# Patient Record
Sex: MALE | Race: WHITE | NOT HISPANIC OR LATINO | Employment: FULL TIME | URBAN - METROPOLITAN AREA
[De-identification: names, ages, dates, MRNs, and addresses within clinical notes are randomized per-mention and may not be internally consistent; named-entity substitution may affect disease eponyms.]

---

## 2017-11-15 ENCOUNTER — ALLSCRIPTS OFFICE VISIT (OUTPATIENT)
Dept: OTHER | Facility: OTHER | Age: 37
End: 2017-11-15

## 2017-11-15 DIAGNOSIS — F52.21 MALE ERECTILE DISORDER (CODE): ICD-10-CM

## 2018-01-11 NOTE — PROGRESS NOTES
Plan  Erectile dysfunction    · (1) CBC/PLT/DIFF; Status:Active; Requested for:53Nlu1146;    · (1) COMPREHENSIVE METABOLIC PANEL; Status:Active; Requested for:57Dyi2885;    · (1) LIPID PANEL, FASTING; Status:Active; Requested for:43Czf3370;    · (1) TESTOSTERONE; Status:Active; Requested for:39Usi8825;    · (1) TSH; Status:Active; Requested for:83Dzs2517;    · (1) URINALYSIS (will reflex a microscopy if leukocytes, occult blood, protein or nitrites  are not within normal limits); Status:Active; Requested for:05Ncg8324;   Flu vaccine need    · Stop: Fluzone Quadrivalent Intramuscular Suspension    Discussion/Summary  Impression: health maintenance visit  Currently, he eats a healthy diet  Prostate cancer screening: PSA is not indicated  Testicular cancer screening: clinical testicular exam was done today  Colorectal cancer screening: colorectal cancer screening is not indicated  Screening lab work includes glucose and lipid profile  The risks and benefits of immunizations were discussed  Doing well  Screening labs recommended  Follow up at the age of 37  History of Present Illness  HM, Adult Male: The patient is being seen for a health maintenance evaluation  The last health maintenance visit was 3 year(s) ago  Social History: Household members include spouse  He is   Work status: working full time and occupation:   The patient is a former cigarette smoker  He reports rare alcohol use  He has never used illicit drugs  General Health: The patient's health since the last visit is described as good  He has regular dental visits  He denies vision problems  He denies hearing loss  Immunizations status: up to date  Lifestyle:  He consumes a diverse and healthy diet  He does not have any weight concerns  He exercises regularly  He does not use tobacco  He denies alcohol use  He denies drug use  Reproductive health:  the patient is sexually active     Screening: cancer screening reviewed and updated  metabolic screening reviewed and updated  risk screening reviewed and updated  HPI: A young man with some erectile dysfunction which is a bit premature but easily treated with sialolith  He had been to Endocrinology several years ago  He was found to have testosterone levels that were the low end of normal but normal on the LEs  No testosterone supplementation was recommended  He feels perfectly well  Family      Review of Systems    Constitutional: no fever and no chills  Cardiovascular: no chest pain and no palpitations  Respiratory: no cough and no shortness of breath during exertion  Integumentary: no rashes and no itching  Neurological: no headache  Endocrine: erectile dysfunction, but as noted in HPI and no muscle weakness  Hematologic/Lymphatic: no swollen glands, no tendency for easy bleeding and no tendency for easy bruising  Active Problems    1  Erectile dysfunction (607 84) (F52 21)   2  Flu vaccine need (V04 81) (Z23)   3  Shoulder pain, right (719 41) (M25 511)   4  Testosterone insufficiency (259 9) (E34 9)    Surgical History    · Denied: History Of Prior Surgery    Family History  Mother    · Family history of type 2 diabetes mellitus (V18 0) (Z83 3)  Father    · Family history of type 2 diabetes mellitus (V18 0) (Z83 3)    Social History    · Alcohol drinker   · Former smoker (V15 82) (H98 314)   · Heterosexual (V15 89) (Z91 89)   · Denied: History of emotional abuse   · Denied: History of physical abuse   · Denied: History of sexual abuse   · History of tattoo (709 09) (L81 8)   · No drug use   · Occupation   · TV cameraman    Current Meds   1  Cialis 20 MG Oral Tablet; TAKE AS DIRECTED; Therapy: (Recorded:30Jan2014) to Recorded   2  Multi For Him Oral Tablet; Therapy: 06VDJ8987 to (Evaluate:00Zrs4646) Recorded    Allergies    1   No Known Drug Allergies    Vitals   Recorded: 81KRV6921 08:05AM   Heart Rate 78   Systolic 92   Diastolic 72   Height 5 ft 10 in Weight 164 lb 8 oz   BMI Calculated 23 6   BSA Calculated 1 92   O2 Saturation 96     Physical Exam    Constitutional   General appearance: No acute distress, well appearing and well nourished  Ears, Nose, Mouth, and Throat   Hearing: Normal     Neck   Neck: Supple, symmetric, trachea midline, no masses  Thyroid: Normal, no thyromegaly  Pulmonary   Respiratory effort: No increased work of breathing or signs of respiratory distress  Auscultation of lungs: Clear to auscultation  Cardiovascular   Auscultation of heart: Normal rate and rhythm, normal S1 and S2, no murmurs  Femoral pulses: 2+ bilaterally  Pedal pulses: 2+ bilaterally  Examination of extremities for edema and/or varicosities: Normal     Chest   Breasts: Normal, no dimpling or skin changes appreciated  Abdomen   Abdomen: Non-tender, no masses  Genitourinary   Scrotal contents: Normal testes, no masses  Musculoskeletal   Gait and station: Normal     Inspection/palpation of joints, bones, and muscles: Normal     Range of motion: Normal     Psychiatric   Judgment and insight: Normal     Orientation to person, place and time: Normal     Recent and remote memory: Intact  Mood and affect: Normal        Results/Data  PHQ-2 Adult Depression Screening 20DHB8836 08:04AM User, Ahs     Test Name Result Flag Reference   PHQ-2 Adult Depression Score 0     Over the last two weeks, how often have you been bothered by any of the following problems? Little interest or pleasure in doing things: Not at all - 0  Feeling down, depressed, or hopeless: Not at all - 0   PHQ-2 Adult Depression Screening Negative         Future Appointments    Date/Time Provider Specialty Site   01/17/2018 08:30 AM BRAYAN Rivera   Dermatology [facility]     Signatures   Electronically signed by : BRAYAN Ramirez ; Nov 15 2017  8:24AM EST                       (Author)

## 2018-01-13 VITALS
OXYGEN SATURATION: 96 % | SYSTOLIC BLOOD PRESSURE: 92 MMHG | BODY MASS INDEX: 23.55 KG/M2 | HEIGHT: 70 IN | HEART RATE: 78 BPM | WEIGHT: 164.5 LBS | DIASTOLIC BLOOD PRESSURE: 72 MMHG

## 2018-01-17 ENCOUNTER — ALLSCRIPTS OFFICE VISIT (OUTPATIENT)
Dept: OTHER | Facility: OTHER | Age: 38
End: 2018-01-17

## 2018-01-18 NOTE — PROGRESS NOTES
Chief Complaint   CC: full body skin cancer screening  History of Present Illness   78-year-old male presents for overall skin check concerned regarding potential skin cancer  Patient's mother will was diagnosed with stage III melanoma therefore all family members have decided to get checked  Nothing specific of concern noted      Assessment   Assessed    1  Multiple benign nevi without atypia (216 9) (D22 9)   2  Screening for skin condition (V82 0) (Z13 89)    Active Problems   Problems    1  Erectile dysfunction (607 84) (F52 21)   2  Flu vaccine need (V04 81) (Z23)   3  Shoulder pain, right (719 41) (M25 511)   4  Testosterone insufficiency (259 9) (E34 9)    Past Medical History      The past medical history was reviewed  Surgical History   Problems    1  Denied: History Of Prior Surgery     Surgical History reviewed      Family History   Mother    1  Family history of type 2 diabetes mellitus (V18 0) (Z83 3)  Father    2  Family history of type 2 diabetes mellitus (V18 0) (Z83 3)  Family History Reviewed- Derm:    Family History was reviewed      Social History   Problems    · Alcohol drinker   · Former smoker (V15 82) (P75 751)   · Heterosexual (V15 89) (Z91 89)   · Denied: History of emotional abuse   · Denied: History of physical abuse   · Denied: History of sexual abuse   · History of tattoo (709 09) (L81 8)   · No drug use   · Occupation  The social history was reviewed      Current Meds    1  Cialis 20 MG Oral Tablet; TAKE AS DIRECTED; Therapy: (Recorded:30Jan2014) to Recorded   2  Multi For Him Oral Tablet; Therapy: 18YUO2287 to (Evaluate:23Odx5826) Recorded    Review of Systems        Constitutional: Denies constitutional symptoms  Eyes: Denies eye symptoms  ENT:  denies ear symptoms, nasal symptoms, mouth or throat symptoms  Cardiovascular: Denies cardiovascular symptoms  Respiratory: Denies respiratory symptoms        Gastrointestinal: Denies gastrointestinal symptoms  Musculoskeletal: Denies musculoskeletal symptoms  Integumentary: Denies symptoms other than stated above  Neurological: Denies neurologic symptoms  Psychiatric: Denies psychiatric symptoms  Endocrine: Denies endocrine symptoms  Hematologic/Lymphatic: Denies hematologic symptoms  Allergies   Medication    1  No Known Drug Allergies    Physical Exam        Constitutional      General appearance: Appears healthy and well developed  Lymphatic      No visible disturbance  Musculoskeletal      Digits and nails: No clubbing, cyanosis or edema  Cutaneous and nail exam normal        Skin      Scalp skin texture and hair distribution: Normal skin texture on scalp, normal hair distribution  Head: Normal turgor, no rashes, no lesions  Neck: Normal turgor, no rashes, no lesions  Chest: Normal turgor, no rashes, no lesions  Abdomen: Normal turgor, no rashes, no lesions  Genitalia, groin, and buttocks: Normal turgor, no rashes, no lesions  Back: Normal turgor, no rashes, no lesions  Right upper extremity: Normal turgor, no rashes, no lesions  Left upper extremity: Normal turgor, no rashes, no lesions  Right lower extremity: Normal turgor, no rashes, no lesions  Left lower extremity: Normal turgor, no rashes, no lesions  Neuro/Psych      Alert and oriented x 3  Displays comfort and cooperation during encounterl   Affect is normal        Finding Normal pigmented lesions all with regular shape and color nothing markedly atypical noted on complete exam       Plan   Multiple benign nevi without atypia    · Moles education given today; Status:Complete;   Done: 81IXU8672  Screening for skin condition    · Follow Up in 2 Years Evaluation and Treatment  Follow-up  Status: Hold For - Scheduling     Requested for: 34LYB8442    Discussion/Summary      Nevi reviewed the concept of ABCDE and ugly duckling nothing markedly atypical noted on complete exam follow-up in 2 years        Signatures    Electronically signed by : BRAYAN Alvarez ; Jan 17 2018  8:37AM EST                       (Author)

## 2018-07-20 ENCOUNTER — OFFICE VISIT (OUTPATIENT)
Dept: INTERNAL MEDICINE CLINIC | Facility: CLINIC | Age: 38
End: 2018-07-20
Payer: COMMERCIAL

## 2018-07-20 VITALS
SYSTOLIC BLOOD PRESSURE: 106 MMHG | WEIGHT: 166.4 LBS | TEMPERATURE: 98.4 F | OXYGEN SATURATION: 98 % | HEART RATE: 73 BPM | DIASTOLIC BLOOD PRESSURE: 78 MMHG | BODY MASS INDEX: 23.82 KG/M2 | HEIGHT: 70 IN

## 2018-07-20 DIAGNOSIS — H10.9 CONJUNCTIVITIS OF RIGHT EYE, UNSPECIFIED CONJUNCTIVITIS TYPE: Primary | ICD-10-CM

## 2018-07-20 PROCEDURE — 3008F BODY MASS INDEX DOCD: CPT | Performed by: PHYSICIAN ASSISTANT

## 2018-07-20 PROCEDURE — 99214 OFFICE O/P EST MOD 30 MIN: CPT | Performed by: PHYSICIAN ASSISTANT

## 2018-07-20 PROCEDURE — 1036F TOBACCO NON-USER: CPT | Performed by: PHYSICIAN ASSISTANT

## 2018-07-20 RX ORDER — TADALAFIL 20 MG/1
TABLET ORAL
COMMUNITY

## 2018-07-20 RX ORDER — IBUPROFEN 200 MG
TABLET ORAL EVERY 6 HOURS PRN
COMMUNITY
End: 2022-01-20

## 2018-07-20 RX ORDER — TOBRAMYCIN AND DEXAMETHASONE 3; 1 MG/ML; MG/ML
1 SUSPENSION/ DROPS OPHTHALMIC
Qty: 10 ML | Refills: 0 | Status: SHIPPED | OUTPATIENT
Start: 2018-07-20 | End: 2019-07-25 | Stop reason: SDDI

## 2018-07-20 RX ORDER — ACETAMINOPHEN 325 MG/1
650 TABLET ORAL EVERY 6 HOURS PRN
COMMUNITY
End: 2022-01-20

## 2018-07-20 NOTE — PROGRESS NOTES
Assessment/Plan:    Right conjunctivitis --Tobramycin eye drops  Good hand hygiene is emphasized  Notify the office if it doesn't improve  No problem-specific Assessment & Plan notes found for this encounter  Diagnoses and all orders for this visit:    Conjunctivitis of right eye, unspecified conjunctivitis type  -     tobramycin-dexamethasone (TOBRADEX) ophthalmic suspension; Administer 1 drop to the right eye every 4 (four) hours while awake    Other orders  -     tadalafil (CIALIS) 20 MG tablet; Take by mouth  -     Multiple Vitamins-Minerals (MULTI FOR HIM PO); Take by mouth Daily  -     acetaminophen (TYLENOL) 325 mg tablet; Take 650 mg by mouth every 6 (six) hours as needed for mild pain  -     ibuprofen (MOTRIN) 200 mg tablet; Take by mouth every 6 (six) hours as needed for mild pain          Subjective:      Patient ID: Winsome Santos is a 41 y o  male  Patient comes in with redness and soreness of his right eye for 2 days  He has been waking up with discharge, sticking lashes and irritated red right eye for the last 2 days  He also developed some cold symptoms with head congestion and a cough productive of clear mucus  No fevers, chills or sweats  No sore throat or ear pain  No known sick contacts  The following portions of the patient's history were reviewed and updated as appropriate: allergies, current medications, past family history, past medical history, past social history, past surgical history and problem list     Review of Systems   HENT: Positive for postnasal drip and rhinorrhea  Negative for ear pain, sinus pain, sinus pressure and sore throat  Eyes: Positive for discharge, redness and itching  Negative for photophobia and visual disturbance  Respiratory: Positive for cough  Negative for shortness of breath  Cardiovascular: Negative for chest pain and palpitations  Gastrointestinal: Negative for abdominal pain and diarrhea           Objective:      BP 106/78   Pulse 73   Temp 98 4 °F (36 9 °C) (Tympanic)   Ht 5' 10" (1 778 m)   Wt 75 5 kg (166 lb 6 4 oz)   SpO2 98%   BMI 23 88 kg/m²          Physical Exam   Constitutional: He appears well-developed and well-nourished  HENT:   Head: Normocephalic and atraumatic  Right Ear: External ear normal    Left Ear: External ear normal    Mouth/Throat: Oropharynx is clear and moist  No oropharyngeal exudate  Eyes: Pupils are equal, round, and reactive to light  Right eye exhibits no discharge  Left eye exhibits no discharge  Right conjunctiva is injected  Left conjunctiva is not injected  Cardiovascular: Normal rate, regular rhythm and normal heart sounds  Pulmonary/Chest: Effort normal and breath sounds normal  No respiratory distress  Abdominal: Soft  Bowel sounds are normal  There is no tenderness

## 2019-07-25 ENCOUNTER — CONSULT (OUTPATIENT)
Dept: SURGERY | Facility: CLINIC | Age: 39
End: 2019-07-25
Payer: COMMERCIAL

## 2019-07-25 ENCOUNTER — PREP FOR PROCEDURE (OUTPATIENT)
Dept: SURGERY | Facility: CLINIC | Age: 39
End: 2019-07-25

## 2019-07-25 VITALS
HEIGHT: 70 IN | BODY MASS INDEX: 24.2 KG/M2 | TEMPERATURE: 96.6 F | SYSTOLIC BLOOD PRESSURE: 110 MMHG | WEIGHT: 169 LBS | HEART RATE: 70 BPM | DIASTOLIC BLOOD PRESSURE: 74 MMHG

## 2019-07-25 DIAGNOSIS — K40.90 NON-RECURRENT UNILATERAL INGUINAL HERNIA WITHOUT OBSTRUCTION OR GANGRENE: Primary | ICD-10-CM

## 2019-07-25 DIAGNOSIS — K40.90 REDUCIBLE LEFT INGUINAL HERNIA: Primary | ICD-10-CM

## 2019-07-25 PROCEDURE — 99203 OFFICE O/P NEW LOW 30 MIN: CPT | Performed by: SURGERY

## 2019-07-25 NOTE — ASSESSMENT & PLAN NOTE
Symptomatic but reducible    Will schedule for open left inguinal hernia repair  The risks, benefits and alternatives were discussed with the patient, including bleeding, infection, injury to nearby structures and risk of recurrence  All questions answered  Consent signed and placed in chart    Ancef on call to OR  No preop testing required

## 2019-07-25 NOTE — PROGRESS NOTES
Office Visit - General Surgery  Treasa Boas MRN: [id number]  Encounter: [id number]    Assessment and Plan    Problem List Items Addressed This Visit        Other    Reducible left inguinal hernia - Primary     Symptomatic but reducible    Will schedule for open left inguinal hernia repair  The risks, benefits and alternatives were discussed with the patient, including bleeding, infection, injury to nearby structures and risk of recurrence  All questions answered  Consent signed and placed in chart  Ancef on call to OR  No preop testing required               Chief Complaint:  Treasa Boas is a 46 y o  male who presents for Inguinal Hernia (Left)    Subjective  Treasa Boas is a 39M who presents with L groin pain for the past month  Pt was seen at an urgent care and diagnosed with a left inguinal hernia  He presents today for surgical evaluation  Pt has not felt a significant bulge in this region but states it is painful  No pain in the left groin  Denies changes in bowel/bladder habits or N/V  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, as well as weight-lifting as a hobby  Past Medical History  History reviewed  No pertinent past medical history  Past Surgical History  History reviewed  No pertinent surgical history      Family History  Family History   Problem Relation Age of Onset    Diabetes Mother     No Known Problems Father        Medications  Current Outpatient Medications on File Prior to Visit   Medication Sig Dispense Refill    acetaminophen (TYLENOL) 325 mg tablet Take 650 mg by mouth every 6 (six) hours as needed for mild pain      ibuprofen (MOTRIN) 200 mg tablet Take by mouth every 6 (six) hours as needed for mild pain      tadalafil (CIALIS) 20 MG tablet Take by mouth      Multiple Vitamins-Minerals (MULTI FOR HIM PO) Take by mouth Daily      [DISCONTINUED] tobramycin-dexamethasone (TOBRADEX) ophthalmic suspension Administer 1 drop to the right eye every 4 (four) hours while awake (Patient not taking: Reported on 7/25/2019) 10 mL 0     No current facility-administered medications on file prior to visit  Allergies  No Known Allergies    Review of Systems   Constitutional: Negative for activity change, chills, fever and unexpected weight change  HENT: Negative  Negative for congestion, sneezing and sore throat  Eyes: Negative  Negative for pain and redness  Respiratory: Negative  Negative for chest tightness, shortness of breath and wheezing  Cardiovascular: Negative  Negative for chest pain and palpitations  Gastrointestinal: Negative for abdominal distention, abdominal pain, constipation, diarrhea, nausea and vomiting  Endocrine: Negative  Negative for cold intolerance and heat intolerance  Genitourinary: Negative for dysuria, hematuria and urgency  Left groin pain   Musculoskeletal: Negative  Negative for arthralgias and back pain  Skin: Negative for color change and rash  Allergic/Immunologic: Negative  Negative for environmental allergies and food allergies  Neurological: Negative  Negative for dizziness and light-headedness  Hematological: Negative  Negative for adenopathy  Does not bruise/bleed easily  Psychiatric/Behavioral: Negative  Negative for agitation and sleep disturbance  The patient is not nervous/anxious  Objective  Vitals:    07/25/19 0819   BP: 110/74   Pulse: 70   Temp: (!) 96 6 °F (35 9 °C)       Physical Exam   Constitutional: He is oriented to person, place, and time  He appears well-developed and well-nourished  No distress  HENT:   Head: Normocephalic and atraumatic  Mouth/Throat: Oropharynx is clear and moist    Eyes: Pupils are equal, round, and reactive to light  Conjunctivae and EOM are normal  No scleral icterus  Neck: Normal range of motion  Neck supple  No tracheal deviation present  No thyromegaly present     Cardiovascular: Normal rate, regular rhythm, normal heart sounds and intact distal pulses  Exam reveals no gallop and no friction rub  No murmur heard  Pulmonary/Chest: Effort normal and breath sounds normal  No stridor  No respiratory distress  He has no wheezes  He has no rales  Abdominal: Soft  He exhibits no distension  There is no rebound and no guarding  Genitourinary: Penis normal    Genitourinary Comments: Small left inguinal hernia, grade 1, reducible  No right inguinal hernia   Musculoskeletal: Normal range of motion  Neurological: He is alert and oriented to person, place, and time  Skin: Skin is warm and dry  He is not diaphoretic  Psychiatric: He has a normal mood and affect  His behavior is normal  Judgment and thought content normal    Nursing note and vitals reviewed

## 2019-08-02 NOTE — PRE-PROCEDURE INSTRUCTIONS
Pre-Surgery Instructions:   Medication Instructions    acetaminophen (TYLENOL) 325 mg tablet Instructed patient per Anesthesia Guidelines   ibuprofen (MOTRIN) 200 mg tablet Instructed patient per Anesthesia Guidelines  Spoke to pt  Medication list reviewed & instructed  As of 8/2 pt to stop ibuprofen  Instructed on tylenol only   Am DOS no meds   Showering instructions given by surgeon office, reviewed @ time of call   Advised no alcohol 24 hour prior to sx  Pt understands  All instructions verbally understood by patient  No further questions

## 2019-08-06 ENCOUNTER — ANESTHESIA EVENT (OUTPATIENT)
Dept: PERIOP | Facility: HOSPITAL | Age: 39
End: 2019-08-06
Payer: COMMERCIAL

## 2019-08-06 NOTE — ANESTHESIA PREPROCEDURE EVALUATION
Review of Systems/Medical History  Patient summary reviewed  Chart reviewed  No history of anesthetic complications     Cardiovascular  Negative cardio ROS    Pulmonary  Negative pulmonary ROS        GI/Hepatic  Negative GI/hepatic ROS          Negative  ROS        Endo/Other  Negative endo/other ROS      GYN  Negative gynecology ROS          Hematology  Negative hematology ROS      Musculoskeletal  Negative musculoskeletal ROS        Neurology  Negative neurology ROS      Psychology   Negative psychology ROS              Physical Exam    Airway    Mallampati score: II  TM Distance: >3 FB  Neck ROM: full     Dental       Cardiovascular  Comment: Negative ROS,     Pulmonary      Other Findings        Anesthesia Plan  ASA Score- 1     Anesthesia Type- general with ASA Monitors  Additional Monitors:   Airway Plan: LMA  Plan Factors-    Induction- intravenous  Postoperative Plan-     Informed Consent- Anesthetic plan and risks discussed with patient  I personally reviewed this patient with the CRNA  Discussed and agreed on the Anesthesia Plan with the CRNA  Sherre Soulier

## 2019-08-07 ENCOUNTER — HOSPITAL ENCOUNTER (OUTPATIENT)
Facility: HOSPITAL | Age: 39
Setting detail: OUTPATIENT SURGERY
Discharge: HOME | End: 2019-08-07
Attending: SURGERY | Admitting: SURGERY
Payer: COMMERCIAL

## 2019-08-07 ENCOUNTER — ANESTHESIA (OUTPATIENT)
Dept: PERIOP | Facility: HOSPITAL | Age: 39
End: 2019-08-07
Payer: COMMERCIAL

## 2019-08-07 VITALS
OXYGEN SATURATION: 99 % | TEMPERATURE: 97.5 F | HEART RATE: 64 BPM | DIASTOLIC BLOOD PRESSURE: 79 MMHG | BODY MASS INDEX: 24.2 KG/M2 | WEIGHT: 169 LBS | HEIGHT: 70 IN | RESPIRATION RATE: 20 BRPM | SYSTOLIC BLOOD PRESSURE: 119 MMHG

## 2019-08-07 DIAGNOSIS — K40.90 NON-RECURRENT UNILATERAL INGUINAL HERNIA WITHOUT OBSTRUCTION OR GANGRENE: Primary | ICD-10-CM

## 2019-08-07 PROCEDURE — 49505 PRP I/HERN INIT REDUC >5 YR: CPT | Performed by: SURGERY

## 2019-08-07 PROCEDURE — C1781 MESH (IMPLANTABLE): HCPCS | Performed by: SURGERY

## 2019-08-07 DEVICE — BARD MESH PERFIX PLUG, MEDIUM
Type: IMPLANTABLE DEVICE | Site: INGUINAL | Status: FUNCTIONAL
Brand: BARD MESH PERFIX PLUG

## 2019-08-07 RX ORDER — FENTANYL CITRATE/PF 50 MCG/ML
50 SYRINGE (ML) INJECTION
Status: DISCONTINUED | OUTPATIENT
Start: 2019-08-07 | End: 2019-08-07 | Stop reason: HOSPADM

## 2019-08-07 RX ORDER — OXYCODONE HYDROCHLORIDE AND ACETAMINOPHEN 5; 325 MG/1; MG/1
2 TABLET ORAL EVERY 4 HOURS PRN
Status: DISCONTINUED | OUTPATIENT
Start: 2019-08-07 | End: 2019-08-07 | Stop reason: HOSPADM

## 2019-08-07 RX ORDER — KETOROLAC TROMETHAMINE 30 MG/ML
INJECTION, SOLUTION INTRAMUSCULAR; INTRAVENOUS AS NEEDED
Status: DISCONTINUED | OUTPATIENT
Start: 2019-08-07 | End: 2019-08-07 | Stop reason: SURG

## 2019-08-07 RX ORDER — FENTANYL CITRATE 50 UG/ML
INJECTION, SOLUTION INTRAMUSCULAR; INTRAVENOUS AS NEEDED
Status: DISCONTINUED | OUTPATIENT
Start: 2019-08-07 | End: 2019-08-07 | Stop reason: SURG

## 2019-08-07 RX ORDER — MAGNESIUM HYDROXIDE 1200 MG/15ML
LIQUID ORAL AS NEEDED
Status: DISCONTINUED | OUTPATIENT
Start: 2019-08-07 | End: 2019-08-07 | Stop reason: HOSPADM

## 2019-08-07 RX ORDER — LIDOCAINE HYDROCHLORIDE 10 MG/ML
INJECTION, SOLUTION INFILTRATION; PERINEURAL AS NEEDED
Status: DISCONTINUED | OUTPATIENT
Start: 2019-08-07 | End: 2019-08-07 | Stop reason: SURG

## 2019-08-07 RX ORDER — ONDANSETRON 2 MG/ML
4 INJECTION INTRAMUSCULAR; INTRAVENOUS EVERY 4 HOURS PRN
Status: DISCONTINUED | OUTPATIENT
Start: 2019-08-07 | End: 2019-08-07 | Stop reason: HOSPADM

## 2019-08-07 RX ORDER — OXYCODONE HYDROCHLORIDE 5 MG/1
5 TABLET ORAL EVERY 4 HOURS PRN
Qty: 30 TABLET | Refills: 0 | Status: SHIPPED | OUTPATIENT
Start: 2019-08-07 | End: 2019-08-17

## 2019-08-07 RX ORDER — ONDANSETRON 2 MG/ML
4 INJECTION INTRAMUSCULAR; INTRAVENOUS ONCE AS NEEDED
Status: DISCONTINUED | OUTPATIENT
Start: 2019-08-07 | End: 2019-08-07 | Stop reason: HOSPADM

## 2019-08-07 RX ORDER — METOCLOPRAMIDE HYDROCHLORIDE 5 MG/ML
10 INJECTION INTRAMUSCULAR; INTRAVENOUS ONCE AS NEEDED
Status: DISCONTINUED | OUTPATIENT
Start: 2019-08-07 | End: 2019-08-07 | Stop reason: HOSPADM

## 2019-08-07 RX ORDER — ONDANSETRON 2 MG/ML
INJECTION INTRAMUSCULAR; INTRAVENOUS AS NEEDED
Status: DISCONTINUED | OUTPATIENT
Start: 2019-08-07 | End: 2019-08-07 | Stop reason: SURG

## 2019-08-07 RX ORDER — SODIUM CHLORIDE, SODIUM LACTATE, POTASSIUM CHLORIDE, CALCIUM CHLORIDE 600; 310; 30; 20 MG/100ML; MG/100ML; MG/100ML; MG/100ML
125 INJECTION, SOLUTION INTRAVENOUS CONTINUOUS
Status: DISCONTINUED | OUTPATIENT
Start: 2019-08-07 | End: 2019-08-07 | Stop reason: HOSPADM

## 2019-08-07 RX ORDER — DEXAMETHASONE SODIUM PHOSPHATE 10 MG/ML
INJECTION, SOLUTION INTRAMUSCULAR; INTRAVENOUS AS NEEDED
Status: DISCONTINUED | OUTPATIENT
Start: 2019-08-07 | End: 2019-08-07 | Stop reason: SURG

## 2019-08-07 RX ORDER — CEFAZOLIN SODIUM 2 G/50ML
2000 SOLUTION INTRAVENOUS
Status: DISCONTINUED | OUTPATIENT
Start: 2019-08-07 | End: 2019-08-07 | Stop reason: HOSPADM

## 2019-08-07 RX ORDER — OXYCODONE HYDROCHLORIDE AND ACETAMINOPHEN 5; 325 MG/1; MG/1
1 TABLET ORAL EVERY 4 HOURS PRN
Status: DISCONTINUED | OUTPATIENT
Start: 2019-08-07 | End: 2019-08-07 | Stop reason: HOSPADM

## 2019-08-07 RX ORDER — MIDAZOLAM HYDROCHLORIDE 1 MG/ML
INJECTION INTRAMUSCULAR; INTRAVENOUS AS NEEDED
Status: DISCONTINUED | OUTPATIENT
Start: 2019-08-07 | End: 2019-08-07 | Stop reason: SURG

## 2019-08-07 RX ORDER — CEFAZOLIN SODIUM 2 G/50ML
SOLUTION INTRAVENOUS AS NEEDED
Status: DISCONTINUED | OUTPATIENT
Start: 2019-08-07 | End: 2019-08-07 | Stop reason: SURG

## 2019-08-07 RX ORDER — HYDROMORPHONE HCL/PF 1 MG/ML
0.5 SYRINGE (ML) INJECTION EVERY 2 HOUR PRN
Status: DISCONTINUED | OUTPATIENT
Start: 2019-08-07 | End: 2019-08-07 | Stop reason: HOSPADM

## 2019-08-07 RX ORDER — PROPOFOL 10 MG/ML
INJECTION, EMULSION INTRAVENOUS AS NEEDED
Status: DISCONTINUED | OUTPATIENT
Start: 2019-08-07 | End: 2019-08-07 | Stop reason: SURG

## 2019-08-07 RX ADMIN — FENTANYL CITRATE 50 MCG: 50 INJECTION, SOLUTION INTRAMUSCULAR; INTRAVENOUS at 09:52

## 2019-08-07 RX ADMIN — KETOROLAC TROMETHAMINE 30 MG: 30 INJECTION, SOLUTION INTRAMUSCULAR at 09:30

## 2019-08-07 RX ADMIN — SODIUM CHLORIDE, SODIUM LACTATE, POTASSIUM CHLORIDE, AND CALCIUM CHLORIDE: .6; .31; .03; .02 INJECTION, SOLUTION INTRAVENOUS at 07:53

## 2019-08-07 RX ADMIN — FENTANYL CITRATE 50 MCG: 50 INJECTION, SOLUTION INTRAMUSCULAR; INTRAVENOUS at 08:25

## 2019-08-07 RX ADMIN — DEXAMETHASONE SODIUM PHOSPHATE 10 MG: 10 INJECTION, SOLUTION INTRAMUSCULAR; INTRAVENOUS at 08:31

## 2019-08-07 RX ADMIN — MIDAZOLAM 2 MG: 1 INJECTION INTRAMUSCULAR; INTRAVENOUS at 08:19

## 2019-08-07 RX ADMIN — FENTANYL CITRATE 50 MCG: 50 INJECTION, SOLUTION INTRAMUSCULAR; INTRAVENOUS at 09:49

## 2019-08-07 RX ADMIN — PROPOFOL 200 MG: 10 INJECTION, EMULSION INTRAVENOUS at 08:25

## 2019-08-07 RX ADMIN — ONDANSETRON 4 MG: 2 INJECTION INTRAMUSCULAR; INTRAVENOUS at 09:30

## 2019-08-07 RX ADMIN — SODIUM CHLORIDE, SODIUM LACTATE, POTASSIUM CHLORIDE, AND CALCIUM CHLORIDE 125 ML/HR: .6; .31; .03; .02 INJECTION, SOLUTION INTRAVENOUS at 10:20

## 2019-08-07 RX ADMIN — FENTANYL CITRATE 50 MCG: 50 INJECTION, SOLUTION INTRAMUSCULAR; INTRAVENOUS at 09:40

## 2019-08-07 RX ADMIN — LIDOCAINE HYDROCHLORIDE 50 MG: 10 INJECTION, SOLUTION INFILTRATION; PERINEURAL at 08:25

## 2019-08-07 RX ADMIN — CEFAZOLIN SODIUM 2000 MG: 2 SOLUTION INTRAVENOUS at 08:32

## 2019-08-07 NOTE — OP NOTE
OPERATIVE REPORT  PATIENT NAME: Génesis Patino    :  1980  MRN: [id number]  Pt Location: [facility] OR ROOM 03    SURGERY DATE: 2019    Surgeon(s) and Role:     * Precious Clemente MD - Primary     * Meg Fiore MD - Assisting    Preop Diagnosis:  Non-recurrent unilateral inguinal hernia without obstruction or gangrene [K40 90]    Post-Op Diagnosis Codes:     * Non-recurrent unilateral inguinal hernia without obstruction or gangrene [K40 90]    Procedure(s) (LRB):  REPAIR HERNIA INGUINAL (Left)    Specimen(s):  * No specimens in log *    Estimated Blood Loss:   Minimal    Drains:  * No LDAs found *    Anesthesia Type:   General    Operative Indications:  Non-recurrent unilateral inguinal hernia without obstruction or gangrene [K40 90]      Operative Findings:  Direct inguinal hernia  Cord lipoma extending from internal inguinal ring without evidence of hernia sac    Complications:   None    Procedure and Technique:  The patient was brought to the operating room identified by name and armband  After induction of general anesthesia and placement of LMA the patient was placed in a supine position  The left groin was prepped and draped in the usual sterile fashion  A transverse incision was made over the left inguinal canal   The soft tissues were divided down to the level of the external oblique aponeurosis  The external inguinal ring was identified and the external oblique aponeurosis was opened along the course of the inguinal canal   The ilioinguinal nerve was identified and initially retracted  A self-retaining tractor was placed and the spermatic cord was dissected free and encircled in a Penrose drain  A large cord lipoma was noted to be emanating from the internal inguinal ring  This was circumferentially dissected and the spermatic cord was skeletonized  The cord lipoma was then ligated at the level the deep internal inguinal ring  An enlarged ring was noted, however there was no hernia sac  This was not large enough to accommodate a medium-size plug and thus the decision was made to defer placement of a preperitoneal plug  The floor of the inguinal canal was noted to be weak with protrusion of preperitoneal fat  The conjoint tendon was then reapproximated primarily to the shelving edge of the inguinal ligament with interrupted Prolene sutures  We then placed our overlay mesh with double-armed Prolene  This was secured to the pubic tubercle medially and run both inferiorly and superiorly along the shelving edge of the inguinal ligament and conjoint tendon respectively  The ilioinguinal nerve was noted to be under tension and thus was divided both proximally and distally  The site was examined to confirm proper orientation and good lie of the mesh  The tails were then reapproximated around the proximal spermatic cord to recreate our brady internal inguinal ring leaving enough space to ensure that the spermatic cord was not entrapped  The site was then copiously irrigated with saline and the external oblique was reapproximated to cover the inguinal canal   The Valentine's fascia was reapproximated with Vicryl suture and the skin was closed with Monocryl  Skin glue was applied  All counts were correct at the end of the procedure  RF wanding was negative for retained foreign bodies  The attending physician was present for all portions of the procedure  The patient was woken from general anesthesia and transferred to PACU in stable condition      Patient Disposition:  PACU     SIGNATURE: Viviana Mendoza MD  DATE: August 7, 2019  TIME: 9:39 AM

## 2019-08-07 NOTE — ANESTHESIA POSTPROCEDURE EVALUATION
Post-Op Assessment Note    CV Status:  Stable  Pain Score: 0    Pain management: adequate     Mental Status:  Alert and awake   Hydration Status:  Euvolemic   PONV Controlled:  Controlled   Airway Patency:  Patent   Post Op Vitals Reviewed: Yes      Staff: CRNA           BP   139/86   Temp   97 2   Pulse  86   Resp   20   SpO2   100

## 2019-08-22 ENCOUNTER — OFFICE VISIT (OUTPATIENT)
Dept: SURGERY | Facility: CLINIC | Age: 39
End: 2019-08-22

## 2019-08-22 VITALS
HEART RATE: 100 BPM | HEIGHT: 70 IN | BODY MASS INDEX: 24.11 KG/M2 | TEMPERATURE: 98 F | DIASTOLIC BLOOD PRESSURE: 68 MMHG | WEIGHT: 168.4 LBS | SYSTOLIC BLOOD PRESSURE: 112 MMHG

## 2019-08-22 DIAGNOSIS — K40.90 REDUCIBLE LEFT INGUINAL HERNIA: Primary | ICD-10-CM

## 2019-08-22 PROCEDURE — 99024 POSTOP FOLLOW-UP VISIT: CPT | Performed by: SURGERY

## 2019-08-22 NOTE — PROGRESS NOTES
Office Visit - General Surgery  Osman Quiroz MRN: [id number]  Encounter: [id number]    Assessment and Plan    SP left inguinal hernia repair   Patient otherwise doing well  Continue with the restrictions for 6 total weeks no heavy lifting pushing pulling greater than 20 lb until 2019  Pain control as needed with NSAID pain medications  Follow-up p r n  Chief Complaint:  Osman Quiroz is a 46 y o  male who presents for Post op hernia evaluation  Subjective  Patient here for follow-up visit following hernia surgery on 2019  Patient otherwise doing well  Notes intermittent knee pain postop  Suspect likely due to his altered gait  Denies any significant scrotal bruising swelling soreness  Denies any nausea vomiting fevers chills denies a significant issues with diarrhea or urinary retention  Does have some minor numbness over the superficial skin of  the wound    Past Medical History  History reviewed  No pertinent past medical history      Past Surgical History  Past Surgical History:   Procedure Laterality Date    ME REPAIR ING HERNIA,5+Y/O,REDUCIBL Left 2019    Procedure: REPAIR HERNIA INGUINAL;  Surgeon: Devon Rodriguez MD;  [facility];  Service: General       Family History  Family History   Problem Relation Age of Onset    Diabetes Mother     No Known Problems Father        Social History  Social History     Socioeconomic History    Marital status: /Civil Union     Spouse name: None    Number of children: None    Years of education: None    Highest education level: None   Occupational History    None   Social Needs    Financial resource strain: None    Food insecurity:     Worry: None     Inability: None    Transportation needs:     Medical: None     Non-medical: None   Tobacco Use    Smoking status: Former Smoker     Packs/day: 1 00     Years: 10 00     Pack years: 10 00     Last attempt to quit:      Years since quittin 6    Smokeless tobacco: Never Used   Substance and Sexual Activity    Alcohol use: Yes     Frequency: 2-3 times a week    Drug use: No    Sexual activity: None   Lifestyle    Physical activity:     Days per week: None     Minutes per session: None    Stress: None   Relationships    Social connections:     Talks on phone: None     Gets together: None     Attends Mu-ism service: None     Active member of club or organization: None     Attends meetings of clubs or organizations: None     Relationship status: None    Intimate partner violence:     Fear of current or ex partner: None     Emotionally abused: None     Physically abused: None     Forced sexual activity: None   Other Topics Concern    None   Social History Narrative    None        Medications  Current Outpatient Medications on File Prior to Visit   Medication Sig Dispense Refill    acetaminophen (TYLENOL) 325 mg tablet Take 650 mg by mouth every 6 (six) hours as needed for mild pain      ibuprofen (MOTRIN) 200 mg tablet Take by mouth every 6 (six) hours as needed for mild pain      tadalafil (CIALIS) 20 MG tablet Take by mouth       No current facility-administered medications on file prior to visit  Allergies  No Known Allergies    Review of Systems  Review of systems otherwise negative unless listed above in HPI  All 11 systems are reviewed  Objective  Vitals:    08/22/19 0834   BP: 112/68   Pulse: 100   Temp: 98 °F (36 7 °C)       Physical Exam   General  NAD well appearing  HEENT  Pupils round equal to light accommodation no scleral icterus noted  Heart regular rate and rhythm no murmurs gallops rubs appreciated  Lungs clear to auscultation chest wall symmetric  Neuro GCS 15 alert oriented x3 full range of motion in all extremities  ABD:Groin wound healing well  No erythema or drainage noted      MSK/Skin:  No rashes erythema noted on scan

## 2020-12-15 ENCOUNTER — OFFICE VISIT (OUTPATIENT)
Dept: DERMATOLOGY | Facility: CLINIC | Age: 40
End: 2020-12-15
Payer: COMMERCIAL

## 2020-12-15 VITALS — TEMPERATURE: 98.1 F

## 2020-12-15 DIAGNOSIS — L73.9 FOLLICULITIS: Primary | ICD-10-CM

## 2020-12-15 DIAGNOSIS — Z12.83 SCREENING FOR SKIN CANCER: ICD-10-CM

## 2020-12-15 DIAGNOSIS — L71.9 ROSACEA: ICD-10-CM

## 2020-12-15 PROCEDURE — 99213 OFFICE O/P EST LOW 20 MIN: CPT | Performed by: DERMATOLOGY

## 2020-12-15 RX ORDER — DOXYCYCLINE HYCLATE 50 MG/1
50 CAPSULE ORAL DAILY
Qty: 30 CAPSULE | Refills: 2 | Status: SHIPPED | OUTPATIENT
Start: 2020-12-15 | End: 2021-02-13

## 2020-12-15 RX ORDER — KETOCONAZOLE 20 MG/ML
1 SHAMPOO TOPICAL 2 TIMES WEEKLY
Qty: 120 ML | Refills: 3 | Status: SHIPPED | OUTPATIENT
Start: 2020-12-17

## 2021-03-31 ENCOUNTER — OFFICE VISIT (OUTPATIENT)
Dept: DERMATOLOGY | Facility: CLINIC | Age: 41
End: 2021-03-31
Payer: COMMERCIAL

## 2021-03-31 VITALS — TEMPERATURE: 97.2 F

## 2021-03-31 DIAGNOSIS — L73.9 FOLLICULITIS: Primary | ICD-10-CM

## 2021-03-31 DIAGNOSIS — L71.9 ROSACEA: ICD-10-CM

## 2021-03-31 PROCEDURE — 99213 OFFICE O/P EST LOW 20 MIN: CPT | Performed by: DERMATOLOGY

## 2021-03-31 RX ORDER — DOXYCYCLINE HYCLATE 50 MG/1
50 CAPSULE ORAL DAILY
Qty: 90 CAPSULE | Refills: 3 | Status: SHIPPED | OUTPATIENT
Start: 2021-03-31 | End: 2021-08-05 | Stop reason: SDUPTHER

## 2021-03-31 NOTE — PATIENT INSTRUCTIONS
At present since this process still is active and going off of the antibiotics seem to flare will go ahead and place him back on doxycycline 50 mg daily if no improvement will need to consider increasing the dosage

## 2021-03-31 NOTE — PROGRESS NOTES
[address]  [facility] [address]  [phone]  [phone]     MRN: [id number] : 1980  Encounter: [id number]  Patient Information: Gurpreet Jalloh  Chief complaint:  Follow-up for folliculitis/rosacea    History of present illness:  67-year-old male presents for follow-up secondary to his folliculitis on the scalp this appears to continue to be an issue patient took the antibiotics for 3 months had 1 flare up during process of taking the medication another 1 in last week when he  ran out of medication  Patient has been using ketoconazole shampoo as well  No past medical history on file  Past Surgical History:   Procedure Laterality Date    AK REPAIR ING HERNIA,5+Y/O,REDUCIBL Left 2019    Procedure: REPAIR HERNIA INGUINAL;  Surgeon: Lucio Garcia MD;  [facility];  Service: General     Social History   Social History     Substance and Sexual Activity   Alcohol Use Yes    Frequency: 2-3 times a week     Social History     Substance and Sexual Activity   Drug Use No     Social History     Tobacco Use   Smoking Status Former Smoker    Packs/day: 1 00    Years: 10 00    Pack years: 10 00    Quit date:     Years since quittin 2   Smokeless Tobacco Never Used     Family History   Problem Relation Age of Onset    Diabetes Mother     No Known Problems Father      Meds/Allergies   No Known Allergies    Meds:  Prior to Admission medications    Medication Sig Start Date End Date Taking?  Authorizing Provider   acetaminophen (TYLENOL) 325 mg tablet Take 650 mg by mouth every 6 (six) hours as needed for mild pain    Historical Provider, MD   ibuprofen (MOTRIN) 200 mg tablet Take by mouth every 6 (six) hours as needed for mild pain    Historical Provider, MD   ketoconazole (NIZORAL) 2 % shampoo Apply 1 application topically 2 (two) times a week To scalp 20   Edvin Flores MD   tadalafil (CIALIS) 20 MG tablet Take by mouth    Historical Provider, MD       Subjective:     Review of Systems:    General: negative for - chills, fatigue, fever,  weight gain or weight loss  Psychological: negative for - anxiety, behavioral disorder, concentration difficulties, decreased libido, depression, irritability, memory difficulties, mood swings, sleep disturbances or suicidal ideation  ENT: negative for - hearing difficulties , nasal congestion, nasal discharge, oral lesions, sinus pain, sneezing, sore throat  Allergy and Immunology: negative for - hives, insect bite sensitivity,  Hematological and Lymphatic: negative for - bleeding problems, blood clots,bruising, swollen lymph nodes  Endocrine: negative for - hair pattern changes, hot flashes, malaise/lethargy, mood swings, palpitations, polydipsia/polyuria, skin changes, temperature intolerance or unexpected weight change  Respiratory: negative for - cough, hemoptysis, orthopnea, shortness of breath, or wheezing  Cardiovascular: negative for - chest pain, dyspnea on exertion, edema,  Gastrointestinal: negative for - abdominal pain, nausea/vomiting  Genito-Urinary: negative for - dysuria, incontinence, irregular/heavy menses or urinary frequency/urgency  Musculoskeletal: negative for - gait disturbance, joint pain, joint stiffness, joint swelling, muscle pain, muscular weakness  Dermatological:  As in HPI  Neurological: negative for confusion, dizziness, headaches, impaired coordination/balance, memory loss, numbness/tingling, seizures, speech problems, tremors or weakness       Objective:   Temp (!) 97 2 °F (36 2 °C)     Physical Exam:    General Appearance:    Alert, cooperative, no distress   Head:    Normocephalic, without obvious abnormality, atraumatic           Skin:   A full skin exam was performed including scalp, head scalp, eyes, ears, nose, lips, neck, chest, axilla, abdomen, back, buttocks, bilateral upper extremities, bilateral lower extremities, hands, feet, fingers, toes, fingernails, and toenails follicular papules pustules noted on the occiput of the scalp     Assessment:     1  Folliculitis     2  Rosacea           Plan: At present since this process still is active and going off of the antibiotics seem to flare will go ahead and place him back on doxycycline 50 mg daily if no improvement will need to consider increasing the dosage    Yohanens Smith MD  3/31/2021,9:25 AM    Portions of the record may have been created with voice recognition software   Occasional wrong word or "sound a like" substitutions may have occurred due to the inherent limitations of voice recognition software   Read the chart carefully and recognize, using context, where substitutions have occurred

## 2021-04-08 DIAGNOSIS — Z23 ENCOUNTER FOR IMMUNIZATION: ICD-10-CM

## 2021-08-05 ENCOUNTER — OFFICE VISIT (OUTPATIENT)
Dept: DERMATOLOGY | Facility: CLINIC | Age: 41
End: 2021-08-05
Payer: COMMERCIAL

## 2021-08-05 DIAGNOSIS — L71.9 ROSACEA: Primary | ICD-10-CM

## 2021-08-05 DIAGNOSIS — L73.9 FOLLICULITIS: ICD-10-CM

## 2021-08-05 PROCEDURE — 99213 OFFICE O/P EST LOW 20 MIN: CPT | Performed by: DERMATOLOGY

## 2021-08-05 RX ORDER — DOXYCYCLINE HYCLATE 50 MG/1
50 CAPSULE ORAL DAILY
Qty: 90 CAPSULE | Refills: 3 | Status: SHIPPED | OUTPATIENT
Start: 2021-08-05 | End: 2022-07-31

## 2021-08-05 NOTE — PROGRESS NOTES
[facility]  [address] [place] [id number]  [phone]  [phone]     MRN: [id number] : 1980  Encounter: [id number]  Patient Information: Sravani Soto  Chief complaint:  For folliculitis    History of present illness:  56-year-old male with history of some rosacea and also folliculitis on the top of the scalp continues to have activity but overall notes improvement since we added doxycycline back no other concerns noted  History reviewed  No pertinent past medical history  Past Surgical History:   Procedure Laterality Date    AR REPAIR ING HERNIA,5+Y/O,REDUCIBL Left 2019    Procedure: REPAIR HERNIA INGUINAL;  Surgeon: Yessy Chris MD;  [facility];  Service: General     Social History   Social History     Substance and Sexual Activity   Alcohol Use Yes     Social History     Substance and Sexual Activity   Drug Use No     Social History     Tobacco Use   Smoking Status Former Smoker    Packs/day: 1 00    Years: 10 00    Pack years: 10 00    Quit date:     Years since quittin 6   Smokeless Tobacco Never Used     Family History   Problem Relation Age of Onset    Diabetes Mother     No Known Problems Father      Meds/Allergies   No Known Allergies    Meds:  Prior to Admission medications    Medication Sig Start Date End Date Taking?  Authorizing Provider   doxycycline hyclate (VIBRAMYCIN) 50 mg capsule Take 1 capsule (50 mg total) by mouth daily 3/31/21 3/26/22 Yes Rubén Cervantes MD   ketoconazole (NIZORAL) 2 % shampoo Apply 1 application topically 2 (two) times a week To scalp 20  Yes Rubén Cervantes MD   tadalafil (CIALIS) 20 MG tablet Take by mouth   Yes Historical Provider, MD   acetaminophen (TYLENOL) 325 mg tablet Take 650 mg by mouth every 6 (six) hours as needed for mild pain    Historical Provider, MD   ibuprofen (MOTRIN) 200 mg tablet Take by mouth every 6 (six) hours as needed for mild pain Historical Provider, MD       Subjective:     Review of Systems:    General: negative for - chills, fatigue, fever,  weight gain or weight loss  Psychological: negative for - anxiety, behavioral disorder, concentration difficulties, decreased libido, depression, irritability, memory difficulties, mood swings, sleep disturbances or suicidal ideation  ENT: negative for - hearing difficulties , nasal congestion, nasal discharge, oral lesions, sinus pain, sneezing, sore throat  Allergy and Immunology: negative for - hives, insect bite sensitivity,  Hematological and Lymphatic: negative for - bleeding problems, blood clots,bruising, swollen lymph nodes  Endocrine: negative for - hair pattern changes, hot flashes, malaise/lethargy, mood swings, palpitations, polydipsia/polyuria, skin changes, temperature intolerance or unexpected weight change  Respiratory: negative for - cough, hemoptysis, orthopnea, shortness of breath, or wheezing  Cardiovascular: negative for - chest pain, dyspnea on exertion, edema,  Gastrointestinal: negative for - abdominal pain, nausea/vomiting  Genito-Urinary: negative for - dysuria, incontinence, irregular/heavy menses or urinary frequency/urgency  Musculoskeletal: negative for - gait disturbance, joint pain, joint stiffness, joint swelling, muscle pain, muscular weakness  Dermatological:  As in HPI  Neurological: negative for confusion, dizziness, headaches, impaired coordination/balance, memory loss, numbness/tingling, seizures, speech problems, tremors or weakness       Objective: There were no vitals taken for this visit      Physical Exam:    General Appearance:    Alert, cooperative, no distress   Head:    Normocephalic, without obvious abnormality, atraumatic           Skin:   A full skin exam was performed including scalp, head scalp, eyes, ears, nose, lips, neck, chest, axilla, abdomen, back, buttocks, bilateral upper extremities, bilateral lower extremities, hands, feet, fingers, toes, fingernails, and toenails face essentially clear still some redness papules noted on the face no definitive pustules noted at this time on the occiput of the scalp     Assessment:     1  Rosacea  doxycycline hyclate (VIBRAMYCIN) 50 mg capsule   2  Folliculitis  doxycycline hyclate (VIBRAMYCIN) 50 mg capsule         Plan:   Rosacea under control with doxycycline the folliculitis is still present but better patient is not really back concerned about the folliculitis but will go ahead and continue with the doxycycline along with the ketoconazole shampoo twice weekly to see if we can keep this process under control    Jillene Siemens, MD  8/5/2021,8:58 AM    Portions of the record may have been created with voice recognition software   Occasional wrong word or "sound a like" substitutions may have occurred due to the inherent limitations of voice recognition software   Read the chart carefully and recognize, using context, where substitutions have occurred

## 2021-08-05 NOTE — PATIENT INSTRUCTIONS
Rosacea under control with doxycycline the folliculitis is still present but better patient is not really back concerned about the folliculitis but will go ahead and continue with the doxycycline along with the ketoconazole shampoo twice weekly to see if we can keep this process under control

## 2022-01-21 ENCOUNTER — TELEPHONE (OUTPATIENT)
Dept: INTERNAL MEDICINE CLINIC | Facility: CLINIC | Age: 42
End: 2022-01-21

## 2022-01-21 ENCOUNTER — OFFICE VISIT (OUTPATIENT)
Dept: INTERNAL MEDICINE CLINIC | Facility: CLINIC | Age: 42
End: 2022-01-21
Payer: COMMERCIAL

## 2022-01-21 ENCOUNTER — APPOINTMENT (OUTPATIENT)
Dept: LAB | Facility: CLINIC | Age: 42
End: 2022-01-21
Payer: COMMERCIAL

## 2022-01-21 VITALS
OXYGEN SATURATION: 98 % | HEART RATE: 78 BPM | HEIGHT: 70 IN | SYSTOLIC BLOOD PRESSURE: 102 MMHG | WEIGHT: 172 LBS | TEMPERATURE: 98.3 F | DIASTOLIC BLOOD PRESSURE: 70 MMHG | BODY MASS INDEX: 24.62 KG/M2

## 2022-01-21 DIAGNOSIS — Z00.00 HEALTHCARE MAINTENANCE: ICD-10-CM

## 2022-01-21 DIAGNOSIS — Z11.59 NEED FOR HEPATITIS C SCREENING TEST: ICD-10-CM

## 2022-01-21 DIAGNOSIS — Z23 ENCOUNTER FOR VACCINATION: Primary | ICD-10-CM

## 2022-01-21 DIAGNOSIS — Z11.4 SCREENING FOR HIV (HUMAN IMMUNODEFICIENCY VIRUS): ICD-10-CM

## 2022-01-21 LAB
ALBUMIN SERPL BCP-MCNC: 4.4 G/DL (ref 3.5–5)
ALP SERPL-CCNC: 54 U/L (ref 46–116)
ALT SERPL W P-5'-P-CCNC: 50 U/L (ref 12–78)
ANION GAP SERPL CALCULATED.3IONS-SCNC: 2 MMOL/L (ref 4–13)
AST SERPL W P-5'-P-CCNC: 20 U/L (ref 5–45)
BACTERIA UR QL AUTO: NORMAL /HPF
BASOPHILS # BLD AUTO: 0.03 THOUSANDS/ΜL (ref 0–0.1)
BASOPHILS NFR BLD AUTO: 1 % (ref 0–1)
BILIRUB SERPL-MCNC: 2.03 MG/DL (ref 0.2–1)
BILIRUB UR QL STRIP: NEGATIVE
BUN SERPL-MCNC: 15 MG/DL (ref 5–25)
CALCIUM SERPL-MCNC: 9.5 MG/DL (ref 8.3–10.1)
CHLORIDE SERPL-SCNC: 105 MMOL/L (ref 100–108)
CHOLEST SERPL-MCNC: 237 MG/DL
CLARITY UR: CLEAR
CO2 SERPL-SCNC: 31 MMOL/L (ref 21–32)
COLOR UR: YELLOW
CREAT SERPL-MCNC: 1.02 MG/DL (ref 0.6–1.3)
EOSINOPHIL # BLD AUTO: 0.1 THOUSAND/ΜL (ref 0–0.61)
EOSINOPHIL NFR BLD AUTO: 3 % (ref 0–6)
ERYTHROCYTE [DISTWIDTH] IN BLOOD BY AUTOMATED COUNT: 12 % (ref 11.6–15.1)
GFR SERPL CREATININE-BSD FRML MDRD: 91 ML/MIN/1.73SQ M
GLUCOSE P FAST SERPL-MCNC: 92 MG/DL (ref 65–99)
GLUCOSE UR STRIP-MCNC: NEGATIVE MG/DL
HCT VFR BLD AUTO: 49.4 % (ref 36.5–49.3)
HCV AB SER QL: NORMAL
HDLC SERPL-MCNC: 61 MG/DL
HGB BLD-MCNC: 16.2 G/DL (ref 12–17)
HGB UR QL STRIP.AUTO: NORMAL
HYALINE CASTS #/AREA URNS LPF: NORMAL /LPF
IMM GRANULOCYTES # BLD AUTO: 0.01 THOUSAND/UL (ref 0–0.2)
IMM GRANULOCYTES NFR BLD AUTO: 0 % (ref 0–2)
KETONES UR STRIP-MCNC: NEGATIVE MG/DL
LDLC SERPL CALC-MCNC: 155 MG/DL (ref 0–100)
LEUKOCYTE ESTERASE UR QL STRIP: NEGATIVE
LYMPHOCYTES # BLD AUTO: 1.34 THOUSANDS/ΜL (ref 0.6–4.47)
LYMPHOCYTES NFR BLD AUTO: 34 % (ref 14–44)
MCH RBC QN AUTO: 29.9 PG (ref 26.8–34.3)
MCHC RBC AUTO-ENTMCNC: 32.8 G/DL (ref 31.4–37.4)
MCV RBC AUTO: 91 FL (ref 82–98)
MONOCYTES # BLD AUTO: 0.43 THOUSAND/ΜL (ref 0.17–1.22)
MONOCYTES NFR BLD AUTO: 11 % (ref 4–12)
NEUTROPHILS # BLD AUTO: 2.01 THOUSANDS/ΜL (ref 1.85–7.62)
NEUTS SEG NFR BLD AUTO: 51 % (ref 43–75)
NITRITE UR QL STRIP: NEGATIVE
NON-SQ EPI CELLS URNS QL MICRO: NORMAL /HPF
NRBC BLD AUTO-RTO: 0 /100 WBCS
PH UR STRIP.AUTO: 7 [PH]
PLATELET # BLD AUTO: 295 THOUSANDS/UL (ref 149–390)
PMV BLD AUTO: 9.6 FL (ref 8.9–12.7)
POTASSIUM SERPL-SCNC: 4.7 MMOL/L (ref 3.5–5.3)
PROT SERPL-MCNC: 8 G/DL (ref 6.4–8.2)
PROT UR STRIP-MCNC: NEGATIVE MG/DL
RBC # BLD AUTO: 5.41 MILLION/UL (ref 3.88–5.62)
RBC #/AREA URNS AUTO: NORMAL /HPF
SODIUM SERPL-SCNC: 138 MMOL/L (ref 136–145)
SP GR UR STRIP.AUTO: 1.01 (ref 1–1.03)
TRIGL SERPL-MCNC: 106 MG/DL
TSH SERPL DL<=0.05 MIU/L-ACNC: 1.17 UIU/ML (ref 0.36–3.74)
UROBILINOGEN UR QL STRIP.AUTO: 0.2 E.U./DL
WBC # BLD AUTO: 3.92 THOUSAND/UL (ref 4.31–10.16)
WBC #/AREA URNS AUTO: NORMAL /HPF

## 2022-01-21 PROCEDURE — 90471 IMMUNIZATION ADMIN: CPT | Performed by: INTERNAL MEDICINE

## 2022-01-21 PROCEDURE — 84443 ASSAY THYROID STIM HORMONE: CPT

## 2022-01-21 PROCEDURE — 3008F BODY MASS INDEX DOCD: CPT | Performed by: INTERNAL MEDICINE

## 2022-01-21 PROCEDURE — 1036F TOBACCO NON-USER: CPT | Performed by: INTERNAL MEDICINE

## 2022-01-21 PROCEDURE — 3725F SCREEN DEPRESSION PERFORMED: CPT | Performed by: INTERNAL MEDICINE

## 2022-01-21 PROCEDURE — 87389 HIV-1 AG W/HIV-1&-2 AB AG IA: CPT

## 2022-01-21 PROCEDURE — 36415 COLL VENOUS BLD VENIPUNCTURE: CPT

## 2022-01-21 PROCEDURE — 90686 IIV4 VACC NO PRSV 0.5 ML IM: CPT | Performed by: INTERNAL MEDICINE

## 2022-01-21 PROCEDURE — 86803 HEPATITIS C AB TEST: CPT

## 2022-01-21 PROCEDURE — 81001 URINALYSIS AUTO W/SCOPE: CPT | Performed by: INTERNAL MEDICINE

## 2022-01-21 PROCEDURE — 80053 COMPREHEN METABOLIC PANEL: CPT

## 2022-01-21 PROCEDURE — 85025 COMPLETE CBC W/AUTO DIFF WBC: CPT

## 2022-01-21 PROCEDURE — 99396 PREV VISIT EST AGE 40-64: CPT | Performed by: INTERNAL MEDICINE

## 2022-01-21 PROCEDURE — 80061 LIPID PANEL: CPT

## 2022-01-21 NOTE — PROGRESS NOTES
Assessment/Plan:       Diagnoses and all orders for this visit:    Encounter for vaccination  -     influenza vaccine, quadrivalent, 0 5 mL, preservative-free, for adult and pediatric patients 6 mos+ (AFLURIA, FLUARIX, FLULAVAL, FLUZONE)    Healthcare maintenance  -     Lipid Panel with Direct LDL reflex; Future  -     CBC and differential; Future  -     Comprehensive metabolic panel; Future  -     TSH, 3rd generation with Free T4 reflex; Future  -     UA (URINE) with reflex to Scope    Need for hepatitis C screening test  -     Hepatitis C Antibody ([facility]); Future    Screening for HIV (human immunodeficiency virus)  -     HIV 1/2 Antigen/Antibody (4th Generation) w Reflex [facility]; Future                Subjective:      Patient ID: Iza Gomez is a 37 y o  male  Preventive examination of a 60-year-old who feels well  Left herniorrhaphy done several years ago     Treated for acne     Feels well  Negative ROS  A      No STD history  No high risk sexual behavior     Family history negative for premature malignancy  Positive for diabetes  No prostate cancer history  The following portions of the patient's history were reviewed and updated as appropriate:   He has no past medical history on file  ,  does not have any pertinent problems on file  ,   has a past surgical history that includes pr repair ing hernia,6+y/o,reducibl (Left, 8/7/2019)  ,  family history includes Diabetes in his mother; No Known Problems in his father  ,   reports that he quit smoking about 15 years ago  He has a 10 00 pack-year smoking history  He has never used smokeless tobacco  He reports current alcohol use of about 2 0 standard drinks of alcohol per week  He reports that he does not use drugs  ,  is allergic to pollen extract     Current Outpatient Medications   Medication Sig Dispense Refill    doxycycline hyclate (VIBRAMYCIN) 50 mg capsule Take 1 capsule (50 mg total) by mouth daily 90 capsule 3    ketoconazole (NIZORAL) 2 % shampoo Apply 1 application topically 2 (two) times a week To scalp 120 mL 3    tadalafil (CIALIS) 20 MG tablet Take by mouth       No current facility-administered medications for this visit  Review of Systems   Constitutional: Negative for chills and fever  HENT: Negative for ear pain and sore throat  Eyes: Negative for pain and visual disturbance  Respiratory: Negative for cough and shortness of breath  Cardiovascular: Negative for chest pain and palpitations  Gastrointestinal: Negative for abdominal pain and vomiting  Genitourinary: Negative for dysuria and hematuria  Musculoskeletal: Negative for arthralgias and back pain  Skin: Negative for color change and rash  Neurological: Negative for seizures and syncope  All other systems reviewed and are negative  Objective:  Vitals:    01/21/22 0822   BP: 102/70   Pulse: 78   Temp: 98 3 °F (36 8 °C)   SpO2: 98%      Physical Exam  Constitutional:       Appearance: He is well-developed  HENT:      Head: Normocephalic and atraumatic  Eyes:      General: No scleral icterus  Pupils: Pupils are equal, round, and reactive to light  Neck:      Thyroid: No thyromegaly  Trachea: No tracheal deviation  Cardiovascular:      Rate and Rhythm: Normal rate and regular rhythm  Heart sounds: Normal heart sounds  No murmur heard  No gallop  Pulmonary:      Effort: Pulmonary effort is normal  No respiratory distress  Breath sounds: No wheezing or rales  Abdominal:      General: Bowel sounds are normal       Palpations: Abdomen is soft  Tenderness: There is no abdominal tenderness  Musculoskeletal:         General: No tenderness or deformity  Normal range of motion  Cervical back: Normal range of motion and neck supple  Skin:     General: Skin is warm and dry  Neurological:      Mental Status: He is alert and oriented to person, place, and time        Coordination: Coordination normal       Deep Tendon Reflexes: Reflexes are normal and symmetric  Psychiatric:         Mood and Affect: Mood normal          Judgment: Judgment normal            Patient Instructions     Preventive examination/ well visit  Needs routine laboratory screening  No indication for PSA given current guidelines        Follow-up visit p r n  or at the age of 40 to initiate colon screening

## 2022-01-21 NOTE — TELEPHONE ENCOUNTER
----- Message from Sebastian Lundborg, MD sent at 1/21/2022  1:12 PM EST -----   Cholesterol is high    Given his age she does not require medication but he should make a conscious effort to reduce the fat intake in the diet and recheck this in about 6 months

## 2022-01-21 NOTE — PATIENT INSTRUCTIONS
Preventive examination/ well visit  Needs routine laboratory screening  No indication for PSA given current guidelines        Follow-up visit p r n  or at the age of 40 to initiate colon screening

## 2022-01-23 LAB — HIV 1+2 AB+HIV1 P24 AG SERPL QL IA: NORMAL

## 2022-01-24 ENCOUNTER — TELEPHONE (OUTPATIENT)
Dept: INTERNAL MEDICINE CLINIC | Facility: CLINIC | Age: 42
End: 2022-01-24

## 2022-01-24 NOTE — TELEPHONE ENCOUNTER
----- Message from Dannie Rasmussen MD sent at 1/21/2022  4:42 PM EST -----   Unexpected finding in urinalysis: Red blood cells in the urine  Not many but given how young he is he needs to get this worked up      This includes a kidney CT scanned and there referral to a kidney specialist, and urologist   I know this is a lot but he is only 41 and I think we need to push this

## 2022-01-28 ENCOUNTER — HOSPITAL ENCOUNTER (OUTPATIENT)
Dept: RADIOLOGY | Facility: MEDICAL CENTER | Age: 42
Discharge: HOME | End: 2022-01-28
Payer: COMMERCIAL

## 2022-01-28 DIAGNOSIS — R31.29 MICROSCOPIC HEMATURIA: ICD-10-CM

## 2022-01-28 PROCEDURE — G1004 CDSM NDSC: HCPCS

## 2022-01-28 PROCEDURE — 74178 CT ABD&PLV WO CNTR FLWD CNTR: CPT

## 2022-01-28 RX ADMIN — IOHEXOL 120 ML: 350 INJECTION, SOLUTION INTRAVENOUS at 10:47

## 2022-01-28 NOTE — PROGRESS NOTES
2/1/2022      Chief Complaint   Patient presents with    Microhematuria         Assessment and Plan    37 y o  male -- New patient    1  Microscopic Hematuria  - UA today negative for nitrites, leukocytes, and positive for blood  Will send for micro and call with results  - Discussed workup of microscopic hematuria including imaging and cystoscopy  - Patient had CT which was negative for  abnormalities  - Return for cystoscopy to complete workup  - Call with any questions or concerns in the meantime  - All questions answered; patient understands and agrees with plan      History of Present Illness  Fish Priscila is a 37 y o  male new patient here for evaluation of microscopic hematuria  Patient had UA microscopic which was positive for microscopic hematuria and RBC 2-4  Patient had recent CT preformed which was negative for any  abnormalities  Denies gross hematuria, flank pain, suprapubic pain, fever, chills, nausea, vomiting  Denies exposures to chemicals on a daily basis  Denies family history of  malignancies  Patient previously smoked tobacco, quit in 2007  Denies seeing urology in the past      Review of Systems   Constitutional: Negative for activity change, appetite change, chills and fever  HENT: Negative for congestion and trouble swallowing  Respiratory: Negative for cough and shortness of breath  Cardiovascular: Negative for chest pain, palpitations and leg swelling  Gastrointestinal: Negative for abdominal pain, constipation, diarrhea, nausea and vomiting  Genitourinary: Negative for difficulty urinating, dysuria, flank pain, frequency, hematuria and urgency  Musculoskeletal: Negative for back pain and gait problem  Skin: Negative for wound  Allergic/Immunologic: Negative for immunocompromised state  Neurological: Negative for dizziness and syncope  Hematological: Does not bruise/bleed easily  Psychiatric/Behavioral: Negative for confusion     All other systems reviewed and are negative  AUA SYMPTOM SCORE      Most Recent Value   AUA SYMPTOM SCORE    How often have you had a sensation of not emptying your bladder completely after you finished urinating? 1 (P)     How often have you had to urinate again less than two hours after you finished urinating? 1 (P)     How often have you found you stopped and started again several times when you urinate? 0 (P)     How often have you found it difficult to postpone urination? 1 (P)     How often have you had a weak urinary stream? 0 (P)     How often have you had to push or strain to begin urination? 0 (P)     How many times did you most typically get up to urinate from the time you went to bed at night until the time you got up in the morning? 2 (P)     Quality of Life: If you were to spend the rest of your life with your urinary condition just the way it is now, how would you feel about that? 1 (P)     AUA SYMPTOM SCORE 5 (P)            Vitals  Vitals:    02/01/22 0736   BP: 114/78   Pulse: 74   SpO2: 98%   Weight: 78 kg (172 lb)   Height: 5' 10" (1 778 m)       Physical Exam  Constitutional:       General: He is not in acute distress  Appearance: Normal appearance  He is not ill-appearing, toxic-appearing or diaphoretic  HENT:      Head: Normocephalic  Nose: No congestion  Eyes:      General: No scleral icterus  Right eye: No discharge  Left eye: No discharge  Conjunctiva/sclera: Conjunctivae normal       Pupils: Pupils are equal, round, and reactive to light  Pulmonary:      Effort: Pulmonary effort is normal    Musculoskeletal:      Cervical back: Normal range of motion  Skin:     General: Skin is warm and dry  Coloration: Skin is not jaundiced or pale  Findings: No bruising, erythema, lesion or rash  Neurological:      General: No focal deficit present  Mental Status: He is alert and oriented to person, place, and time  Mental status is at baseline        Gait: Gait normal    Psychiatric:         Mood and Affect: Mood normal          Behavior: Behavior normal          Thought Content: Thought content normal          Judgment: Judgment normal            Past History  Past Medical History:   Diagnosis Date    Microhematuria      Social History     Socioeconomic History    Marital status: /Civil Union     Spouse name: None    Number of children: None    Years of education: None    Highest education level: None   Occupational History    None   Tobacco Use    Smoking status: Former Smoker     Packs/day: 1 00     Years: 10 00     Pack years: 10 00     Quit date: 2007     Years since quitting: 15 0    Smokeless tobacco: Never Used   Vaping Use    Vaping Use: Never used   Substance and Sexual Activity    Alcohol use:  Yes     Alcohol/week: 2 0 standard drinks     Types: 2 Shots of liquor per week     Comment: about 4 days a week     Drug use: No    Sexual activity: Yes     Partners: Female   Other Topics Concern    None   Social History Narrative    None     Social Determinants of Health     Financial Resource Strain: Not on file   Food Insecurity: Not on file   Transportation Needs: Not on file   Physical Activity: Not on file   Stress: Not on file   Social Connections: Not on file   Intimate Partner Violence: Not on file   Housing Stability: Not on file     Social History     Tobacco Use   Smoking Status Former Smoker    Packs/day: 1 00    Years: 10 00    Pack years: 10 00    Quit date: 2007    Years since quitting: 15 0   Smokeless Tobacco Never Used     Family History   Problem Relation Age of Onset    Diabetes Mother     No Known Problems Father        The following portions of the patient's history were reviewed and updated as appropriate: allergies, current medications, past medical history, past social history, past surgical history and problem list     Results  Recent Results (from the past 1 hour(s))   POCT urine dip    Collection Time: 02/01/22 7:42 AM   Result Value Ref Range    LEUKOCYTE ESTERASE,UA -     NITRITE,UA -     SL AMB POCT UROBILINOGEN 0 2     POCT URINE PROTEIN -      PH,UA 7 5     BLOOD,UA hemo trace     SPECIFIC GRAVITY,UA 1 010     KETONES,UA -     BILIRUBIN,UA -     GLUCOSE, UA -      COLOR,UA yellow     CLARITY,UA clear    ]  No results found for: PSA  Lab Results   Component Value Date    CALCIUM 9 5 01/21/2022    K 4 7 01/21/2022    CO2 31 01/21/2022     01/21/2022    BUN 15 01/21/2022    CREATININE 1 02 01/21/2022     Lab Results   Component Value Date    WBC 3 92 (L) 01/21/2022    HGB 16 2 01/21/2022    HCT 49 4 (H) 01/21/2022    MCV 91 01/21/2022     01/21/2022       Priyank Caputo PA-C

## 2022-02-01 ENCOUNTER — OFFICE VISIT (OUTPATIENT)
Dept: UROLOGY | Facility: CLINIC | Age: 42
End: 2022-02-01
Payer: COMMERCIAL

## 2022-02-01 VITALS
OXYGEN SATURATION: 98 % | WEIGHT: 172 LBS | HEIGHT: 70 IN | BODY MASS INDEX: 24.62 KG/M2 | DIASTOLIC BLOOD PRESSURE: 78 MMHG | SYSTOLIC BLOOD PRESSURE: 114 MMHG | HEART RATE: 74 BPM

## 2022-02-01 DIAGNOSIS — R31.29 MICROSCOPIC HEMATURIA: Primary | ICD-10-CM

## 2022-02-01 LAB
BACTERIA UR QL AUTO: NORMAL /HPF
BILIRUB UR QL STRIP: NEGATIVE
CLARITY UR: CLEAR
COLOR UR: YELLOW
GLUCOSE UR STRIP-MCNC: NEGATIVE MG/DL
HGB UR QL STRIP.AUTO: NORMAL
KETONES UR STRIP-MCNC: NEGATIVE MG/DL
LEUKOCYTE ESTERASE UR QL STRIP: NEGATIVE
NITRITE UR QL STRIP: NEGATIVE
NON-SQ EPI CELLS URNS QL MICRO: NORMAL /HPF
PH UR STRIP.AUTO: 7 [PH]
PROT UR STRIP-MCNC: NEGATIVE MG/DL
RBC #/AREA URNS AUTO: NORMAL /HPF
SL AMB  POCT GLUCOSE, UA: NORMAL
SL AMB LEUKOCYTE ESTERASE,UA: NORMAL
SL AMB POCT BILIRUBIN,UA: NORMAL
SL AMB POCT BLOOD,UA: NORMAL
SL AMB POCT CLARITY,UA: CLEAR
SL AMB POCT COLOR,UA: YELLOW
SL AMB POCT KETONES,UA: NORMAL
SL AMB POCT NITRITE,UA: NORMAL
SL AMB POCT PH,UA: 7.5
SL AMB POCT SPECIFIC GRAVITY,UA: 1.01
SL AMB POCT URINE PROTEIN: NORMAL
SL AMB POCT UROBILINOGEN: 0.2
SP GR UR STRIP.AUTO: 1.01 (ref 1–1.03)
UROBILINOGEN UR QL STRIP.AUTO: 0.2 E.U./DL
WBC #/AREA URNS AUTO: NORMAL /HPF

## 2022-02-01 PROCEDURE — 1036F TOBACCO NON-USER: CPT | Performed by: PHYSICIAN ASSISTANT

## 2022-02-01 PROCEDURE — 3008F BODY MASS INDEX DOCD: CPT | Performed by: PHYSICIAN ASSISTANT

## 2022-02-01 PROCEDURE — 99244 OFF/OP CNSLTJ NEW/EST MOD 40: CPT | Performed by: PHYSICIAN ASSISTANT

## 2022-02-01 PROCEDURE — 81002 URINALYSIS NONAUTO W/O SCOPE: CPT | Performed by: PHYSICIAN ASSISTANT

## 2022-02-01 PROCEDURE — 81001 URINALYSIS AUTO W/SCOPE: CPT | Performed by: PHYSICIAN ASSISTANT

## 2022-02-02 ENCOUNTER — TELEPHONE (OUTPATIENT)
Dept: INTERNAL MEDICINE CLINIC | Facility: CLINIC | Age: 42
End: 2022-02-02

## 2022-02-02 ENCOUNTER — TELEPHONE (OUTPATIENT)
Dept: UROLOGY | Facility: CLINIC | Age: 42
End: 2022-02-02

## 2022-02-02 NOTE — TELEPHONE ENCOUNTER
----- Message from Earlene Cavazos PA-C sent at 2/2/2022  8:54 AM EST -----  UA negative for microscopic hematuria  Can cancel cysto and follow up in office in 6 months for repeat testing  Thanks!

## 2022-02-02 NOTE — TELEPHONE ENCOUNTER
----- Message from Yobany Quinn MD sent at 2/2/2022  7:43 AM EST -----  Please call the patient regarding his  result  Normal kidney and bladder CT scan which is good    Next step is consult with the urologist

## 2022-07-26 ENCOUNTER — TELEPHONE (OUTPATIENT)
Dept: INTERNAL MEDICINE CLINIC | Facility: CLINIC | Age: 42
End: 2022-07-26

## 2022-07-26 DIAGNOSIS — Z00.00 HEALTHCARE MAINTENANCE: Primary | ICD-10-CM

## 2022-07-26 NOTE — TELEPHONE ENCOUNTER
Pt would like labs ordered to check his cholesterol    6m fup from his physical     Please advise, call back upon completion

## 2022-08-10 ENCOUNTER — APPOINTMENT (OUTPATIENT)
Dept: LAB | Facility: CLINIC | Age: 42
End: 2022-08-10
Payer: COMMERCIAL

## 2022-08-10 ENCOUNTER — OFFICE VISIT (OUTPATIENT)
Dept: DERMATOLOGY | Facility: CLINIC | Age: 42
End: 2022-08-10
Payer: COMMERCIAL

## 2022-08-10 VITALS — WEIGHT: 172 LBS | BODY MASS INDEX: 24.62 KG/M2 | HEIGHT: 70 IN

## 2022-08-10 DIAGNOSIS — L72.9 FOLLICULAR CYST OF SKIN: Primary | ICD-10-CM

## 2022-08-10 DIAGNOSIS — Z00.00 HEALTHCARE MAINTENANCE: ICD-10-CM

## 2022-08-10 DIAGNOSIS — L73.9 FOLLICULITIS: ICD-10-CM

## 2022-08-10 DIAGNOSIS — L71.9 ROSACEA: ICD-10-CM

## 2022-08-10 DIAGNOSIS — Z13.89 SCREENING FOR SKIN CONDITION: ICD-10-CM

## 2022-08-10 LAB
CHOLEST SERPL-MCNC: 197 MG/DL
HDLC SERPL-MCNC: 62 MG/DL
LDLC SERPL CALC-MCNC: 104 MG/DL (ref 0–100)
TRIGL SERPL-MCNC: 156 MG/DL

## 2022-08-10 PROCEDURE — 80061 LIPID PANEL: CPT

## 2022-08-10 PROCEDURE — 99213 OFFICE O/P EST LOW 20 MIN: CPT | Performed by: DERMATOLOGY

## 2022-08-10 PROCEDURE — 36415 COLL VENOUS BLD VENIPUNCTURE: CPT

## 2022-08-10 RX ORDER — KETOCONAZOLE 20 MG/ML
1 SHAMPOO TOPICAL 2 TIMES WEEKLY
Qty: 120 ML | Refills: 3 | Status: SHIPPED | OUTPATIENT
Start: 2022-08-11

## 2022-08-10 RX ORDER — DOXYCYCLINE 50 MG/1
50 TABLET ORAL DAILY
Qty: 30 TABLET | Refills: 11 | Status: SHIPPED | OUTPATIENT
Start: 2022-08-10 | End: 2023-08-05

## 2022-08-10 RX ORDER — DOXYCYCLINE 50 MG/1
50 TABLET ORAL DAILY
COMMUNITY
End: 2022-08-10 | Stop reason: SDUPTHER

## 2022-08-10 NOTE — PATIENT INSTRUCTIONS
Follicular cyst advised patient that this is from hair follicles that ballooned out and forms this type of growth  No treatment indicated unless patient so desires or if lesion enlarges or changes    Rosacea/folliculitis both appear to be under control continue same therapy  Screening for Dermatologic Disorders: Nothing else of concern noted on complete exam follow up in 1 year

## 2022-08-10 NOTE — PROGRESS NOTES
[address]  [facility] [address]  [place] [address]  [phone]  [phone]     MRN: [id number] : 1980  Encounter: [id number]  Patient Information: Violette Hall  Chief complaint: yearly check up    History of present illness:  27-year-old male with known history of folliculitis and rosacea overall doing well with the continue treatment with both ketoconazole shampoo along with doxycycline 50 mg daily has occasional activity but no bad flare ups as a had in the past  Past Medical History:   Diagnosis Date    Microhematuria      Past Surgical History:   Procedure Laterality Date    [place] REPAIR ING HERNIA,5+Y/O,REDUCIBL Left 2019    Procedure: REPAIR HERNIA INGUINAL;  Surgeon: Rubi Bojorquez MD;  Location: [facility];  Service: General     Social History   Social History     Substance and Sexual Activity   Alcohol Use Yes    Alcohol/week: 2 0 standard drinks    Types: 2 Shots of liquor per week    Comment: about 4 days a week      Social History     Substance and Sexual Activity   Drug Use No     Social History     Tobacco Use   Smoking Status Former Smoker    Packs/day: 1     Years: 10 00    Pack years: 10 00    Quit date:     Years since quitting: 15 6   Smokeless Tobacco Never Used     Family History   Problem Relation Age of Onset    Diabetes Mother     No Known Problems Father      Meds/Allergies   Allergies   Allergen Reactions    Pollen Extract Cough       Meds:  Prior to Admission medications    Medication Sig Start Date End Date Taking?  Authorizing Provider   doxycycline (ADOXA) 50 MG tablet Take 50 mg by mouth daily   Yes Historical Provider, MD   ketoconazole (NIZORAL) 2 % shampoo Apply 1 application topically 2 (two) times a week To scalp 20  Yes Stef Salas MD   tadalafil (CIALIS) 20 MG tablet Take by mouth   Yes Historical Provider, MD       Subjective:     Review of Systems:    General: negative for - chills, fatigue, fever,  weight gain or weight loss  Psychological: negative for - anxiety, behavioral disorder, concentration difficulties, decreased libido, depression, irritability, memory difficulties, mood swings, sleep disturbances or suicidal ideation  ENT: negative for - hearing difficulties , nasal congestion, nasal discharge, oral lesions, sinus pain, sneezing, sore throat  Allergy and Immunology: negative for - hives, insect bite sensitivity,  Hematological and Lymphatic: negative for - bleeding problems, blood clots,bruising, swollen lymph nodes  Endocrine: negative for - hair pattern changes, hot flashes, malaise/lethargy, mood swings, palpitations, polydipsia/polyuria, skin changes, temperature intolerance or unexpected weight change  Respiratory: negative for - cough, hemoptysis, orthopnea, shortness of breath, or wheezing  Cardiovascular: negative for - chest pain, dyspnea on exertion, edema,  Gastrointestinal: negative for - abdominal pain, nausea/vomiting  Genito-Urinary: negative for - dysuria, incontinence, irregular/heavy menses or urinary frequency/urgency  Musculoskeletal: negative for - gait disturbance, joint pain, joint stiffness, joint swelling, muscle pain, muscular weakness  Dermatological:  As in HPI  Neurological: negative for confusion, dizziness, headaches, impaired coordination/balance, memory loss, numbness/tingling, seizures, speech problems, tremors or weakness       Objective:   Ht 5' 10" (1 778 m)   Wt 78 kg (172 lb)   BMI 24 68 kg/m²     Physical Exam:    General Appearance:    Alert, cooperative, no distress   Head:    Normocephalic, without obvious abnormality, atraumatic           Skin:   A full skin exam was performed including scalp, head scalp, eyes, ears, nose, lips, neck, chest, axilla, abdomen, back, buttocks, bilateral upper extremities, bilateral lower extremities, hands, feet, fingers, toes, fingernails, and toenails small cystic papule noted on right posterior shoulder slight papules noted on the scalp with postinflammatory pigmentation no real active disease at this time nothing else of concern noted on complete exam     Assessment:     1  Follicular cyst of skin     2  Rosacea  doxycycline (ADOXA) 50 MG tablet   3  Folliculitis  doxycycline (ADOXA) 50 MG tablet    ketoconazole (NIZORAL) 2 % shampoo   4  Screening for skin condition           Plan: Follicular cyst advised patient that this is from hair follicles that ballooned out and forms this type of growth  No treatment indicated unless patient so desires or if lesion enlarges or changes  Rosacea/folliculitis both appear to be under control continue same therapy  Screening for Dermatologic Disorders: Nothing else of concern noted on complete exam follow up in 1 year       Walter Blackwell MD  8/10/2022,8:30 AM    Portions of the record may have been created with voice recognition software   Occasional wrong word or "sound a like" substitutions may have occurred due to the inherent limitations of voice recognition software   Read the chart carefully and recognize, using context, where substitutions have occurred

## 2022-10-12 PROBLEM — Z00.00 HEALTHCARE MAINTENANCE: Status: RESOLVED | Noted: 2022-01-21 | Resolved: 2022-10-12

## 2023-08-15 ENCOUNTER — OFFICE VISIT (OUTPATIENT)
Age: 43
End: 2023-08-15
Payer: COMMERCIAL

## 2023-08-15 VITALS — HEIGHT: 70 IN | BODY MASS INDEX: 24.34 KG/M2 | WEIGHT: 170 LBS

## 2023-08-15 DIAGNOSIS — L71.9 ROSACEA: ICD-10-CM

## 2023-08-15 DIAGNOSIS — L73.9 FOLLICULITIS: ICD-10-CM

## 2023-08-15 DIAGNOSIS — D18.01 CHERRY ANGIOMA: ICD-10-CM

## 2023-08-15 DIAGNOSIS — D22.9 MULTIPLE NEVI: ICD-10-CM

## 2023-08-15 DIAGNOSIS — Z13.89 SCREENING FOR SKIN CONDITION: Primary | ICD-10-CM

## 2023-08-15 PROCEDURE — 99214 OFFICE O/P EST MOD 30 MIN: CPT | Performed by: DERMATOLOGY

## 2023-08-15 RX ORDER — DOXYCYCLINE 50 MG/1
CAPSULE ORAL
COMMUNITY
Start: 2023-07-07 | End: 2023-08-15 | Stop reason: SDUPTHER

## 2023-08-15 RX ORDER — DOXYCYCLINE 50 MG/1
50 CAPSULE ORAL DAILY
Qty: 90 CAPSULE | Refills: 3 | Status: SHIPPED | OUTPATIENT
Start: 2023-08-15 | End: 2024-08-09

## 2023-08-15 RX ORDER — KETOCONAZOLE 20 MG/ML
1 SHAMPOO TOPICAL 2 TIMES WEEKLY
Qty: 120 ML | Refills: 3 | Status: SHIPPED | OUTPATIENT
Start: 2023-08-17

## 2023-08-15 NOTE — PATIENT INSTRUCTIONS
ACTINIC KERATOSIS    Actinic keratoses are very common on sites repeatedly exposed to the sun, especially the backs of the hands and the face, most often affecting the ears, nose, cheeks, upper lip, vermilion of the lower lip, temples, forehead and balding scalp. In severely chronically sun-damaged individuals, they may also be found on the upper trunk, upper and lower limbs, and dorsum of feet. We discussed the theoretical premalignant (“pre-cancerous”) nature and etiology of these growths. We discussed the prevailing notion that actinic keratoses are a reflection of abnormal skin cell development due to DNA damage by short wavelength UVB. They are more likely to appear if the immune function is poor, due to aging, recent sun exposure, predisposing disease or certain drugs. We discussed that the main concern is that actinic keratoses may predispose to squamous cell carcinoma. It is rare for a solitary actinic keratosis to evolve to squamous cell carcinoma (SCC), but the risk of SCC occurring at some stage in a patient with more than 10 actinic keratoses is thought to be about 10 to 15%. A tender, thickened, ulcerated or enlarging actinic keratosis is suspicious of SCC. Actinic keratoses may be prevented by strict sun protection. If already present, keratoses may improve with a very high sun protection factor (50+) broad-spectrum sunscreen applied at least daily to affected areas, year-round. We recommend that UPF-rated clothing and hats and sunglasses be worn whenever possible and that a sunscreen-moisturizer combination product such as Neutrogena Daily Defense be applied at least three times a day.     We performed a thorough discussion of treatment options and specific risk/benefits/alternatives including but not limited to medical “field” treatment with medications such as the following:    Topical “field area” medications such as 5-fluorouracil or Aldara (specifically, the trouble with long-term compliance, blistering and local skin reaction versus the convenience of at-home therapy and that field therapy “gets what is not yet seen”). Cryotherapy (specifically, local pain, scarring, dyspigmentation, blistering, possible superinfection, and treats “only what we see” versus directed treatment today). Photodynamic therapy (specifically, local pain, scarring, dyspigmentation, blistering, possible superinfection, need to schedule for a later date, and time spent in the office versus field therapy that “gets what is not yet seen”). BASAL CELL CARCINOMA    What is basal cell carcinoma? Basal cell carcinoma (BCC) is a common, locally invasive, keratinocytic, or non-melanoma, skin cancer. It is also known as rodent ulcer and basalioma. Patients with BCC often develop multiple primary tumours over time. Who gets basal cell carcinoma? Risk factors for BCC include:  Age and sex: BCCs are particularly prevalent in elderly males. However, they also affect females and younger adults   Previous BCC or other form of skin cancer (squamous cell carcinoma, melanoma)   Sun damage (photoaging, actinic keratoses)   Repeated prior episodes of sunburn   Fair skin, blue eyes and blond or red hair--note; BCC can also affect darker skin types   Previous cutaneous injury, thermal burn, disease (eg cutaneous lupus, sebaceous naevus)   Inherited syndromes: BCC is a particular problem for families with basal cell naevus syndrome (Gorlin syndrome), Zwakr-Ehhmé-Yefcbxwz syndrome, Rombo syndrome, Oley syndrome and xeroderma pigmentosum   Other risk factors include ionising radiation, exposure to arsenic, and immune suppression due to disease or medicines    What causes basal cell carcinoma?   The cause of BCC is multifactorial.  Most often, there are DNA mutations in the patched [facility]) tumour suppressor gene, part of hedgehog signaling pathway   These may be triggered by exposure to ultraviolet radiation   Various spontaneous and inherited gene defects predispose to [facility]    What are the clinical features of basal cell carcinoma? BCC is a locally invasive skin tumour. The main characteristics are:  Slowly growing plaque or nodule   Skin coloured, pink or pigmented   Varies in size from a few millimetres to several centimetres in diameter   Spontaneous bleeding or ulceration  BCC is very rarely a threat to life. A tiny proportion of BCCs grow rapidly, invade deeply, and/or metastasise to local lymph nodes. Types of basal cell carcinoma  There are several distinct clinical types of BCC, and over 20 histological growth patterns of BCC. Nodular BCC  Most common type of facial BCC   Shiny or pearly nodule with a smooth surface   May have central depression or ulceration, so its edges appear rolled   Blood vessels cross its surface   Cystic variant is soft, with jelly-like contents   Micronodular, microcystic and infiltrative types are potentially aggressive subtypes   Also known as nodulocystic carcinoma  Superficial BCC  Most common type in younger adults   Most common type on upper trunk and shoulders   Slightly scaly, irregular plaque   Thin, translucent rolled border   Multiple microerosions  Morphoeaform BCC  Usually found in mid-facial sites   Waxy, scar-like plaque with indistinct borders   Wide and deep subclinical extension   May infiltrate cutaneous nerves (perineural spread)   Also known as morpheic, morphoeiform or sclerosing BCC  Basosquamous carcinoma  Mixed basal cell carcinoma (BCC) and squamous cell carcinoma (SCC)   Infiltrative growth pattern   Potentially more aggressive than other forms of BCC   Also known as basisquamous carcinoma and mixed basal-squamous cell carcinoma       Complications of basal cell carcinoma    Recurrent BCC  Recurrence of BCC after initial treatment is not uncommon. Characteristics of recurrent BCC often include:   Incomplete excision or narrow margins at primary excision   Morphoeic, micronodular, and infiltrative subtypes   Location on head and neck    Advanced BCC  Advanced BCCs are large, often neglected tumours. They may be several centimetres in diameter   They may be deeply infiltrating into tissues below the skin   They are difficult or impossible to treat surgically    Metastatic BCC  Very rare   Primary tumour is often large, neglected or recurrent, located on head and neck, with aggressive subtype   May have had multiple prior treatments   May arise in site exposed to ionising radiation   Can be fatal    How is basal cell carcinoma diagnosed? BCC is diagnosed clinically by the presence of a slowly enlarging skin lesion with typical appearance. The diagnosis and  by a diagnostic biopsy or following excision. Some typical superficial BCCs on trunk and limbs are clinically diagnosed and have non-surgical treatment without histology. What is the treatment for primary basal cell carcinoma? The treatment for a [address] [place] [address] depends on its type, size and location, the number to be treated, patient factors, and the preference or expertise of the doctor. Most BCCs are treated surgically. Long-term follow-up is recommended to check for new lesions and recurrence; the latter may be unnecessary if histology has reported wide clear margins. Excision biopsy  Excision means the lesion is cut out and the skin stitched up. Most appropriate treatment for nodular, infiltrative and morphoeic BCCs   Should include 3 to 5 mm margin of normal skin around the tumour   Very large lesions may require flap or skin graft to repair the defect   Pathologist will report deep and lateral margins   Further surgery is recommended for lesions that are incompletely excised    Mohs micrographically controlled excision  Mohs micrographically controlled surgery involves examining carefully marked excised tissue under the microscope, layer by layer, to ensure complete excision.   Very high cure rates achieved by trained Mohs surgeons   Used in high-risk areas of the face around eyes, lips and nose   Suitable for ill-defined, morphoeic, infiltrative and recurrent subtypes   Large defects are repaired by flap or skin graft    Superficial skin surgery  Superficial skin surgery comprises shave, curettage, and electrocautery. It is a rapid technique using local anaesthesia and does not require sutures. Suitable for small, well-defined nodular or superficial BCCs   Lesions are usually located on trunk or limbs   Wound is left open to heal by secondary intention   Moist wound dressings lead to healing within a few weeks   Eventual scar quality variable    Cryotherapy  Cryotherapy is the treatment of a superficial skin lesion by freezing it, usually with liquid nitrogen. Suitable for small superficial BCCs on covered areas of trunk and limbs   Best avoided for BCCs on head and neck, and distal to knees   Double freeze-thaw technique   Results in a blister that crusts over and heals within several weeks. Leaves permanent white sania    Photodynamic therapy  Photodynamic therapy (PDT) refers to a technique in which [facility] is treated with a photosensitising chemical, and exposed to light several hours later. Topical photosensitisers include aminolevulinic acid lotion and methyl aminolevulinate cream   Suitable for low-risk small, superficial BCCs   Best avoided if tumour in site at high risk of recurrence   Results in inflammatory reaction, maximal 3-4 days after procedure   Treatment repeated 7 days after initial treatment   Excellent cosmetic results    Imiquimod cream  Imiquimod is an immune response modifier. Best used for superficial BCCs less than 2 cm diameter   Applied three to five times each week, for 6-16 weeks   Results in a variable inflammatory reaction, maximal at three weeks   Minimal scarring is usual    Fluorouracil cream  5-Fluorouracil cream is a topical cytotoxic agent.   Used to treat small superficial basal cell carcinomas Requires prolonged course, eg twice daily for 6-12 weeks   Causes inflammatory reaction   Has high recurrence rates    Radiotherapy  Radiotherapy or X-ray treatment can be used to treat primary BCCs or as adjunctive treatment if margins are incomplete. Mainly used if surgery is not suitable   Best avoided in young patients and in genetic conditions predisposing to skin cancer   Best cosmetic results achieved using multiple fractions   Typically, patient attends once-weekly for several weeks   Causes inflammatory reaction followed by scar   Risk of radiodermatitis, late recurrence, and new tumours    What is the treatment for advanced or metastatic basal cell carcinoma? Locally advanced primary, recurrent or metastatic BCC requires multidisciplinary consultation. Often a combination of treatments is used. Surgery   Radiotherapy   Targeted therapy  Targeted therapy refers to the hedgehog signalling pathway inhibitors, vismodegib and sonidegib. These drugs have some important risks and side effects. How can basal cell carcinoma be prevented? The most important way to prevent BCC is to avoid sunburn. This is especially important in childhood and early life. Fair skinned individuals and those with a personal or family history of BCC should protect their skin from sun exposure daily, year-round and lifelong. Stay indoors or under the shade in the middle of the day   Wear covering clothing   Apply high protection factor SPF50+ broad-spectrum sunscreens generously to exposed skin if outdoors   Avoid indoor tanning (sun beds, solaria)  Oral nicotinamide (vitamin B3) in a dose of 500 mg twice daily may reduce the number and severity of BCCs. What is the outlook for basal cell carcinoma? Most BCCs are cured by treatment. Cure is most likely if treatment is undertaken when the lesion is small.   About 50% of people with BCC develop a second one within 3 years of the first. They are also at increased risk of other skin cancers, especially melanoma. Regular self-skin examinations and long-term annual skin checks by an experienced health professional are recommended. SQUAMOUS CELL CARCINOMA    What is cutaneous squamous cell carcinoma? Cutaneous squamous cell carcinoma (SCC) is a common type of keratinocyte or non-melanoma skin cancer. It is derived from cells within the epidermis that make keratin -- the horny protein that makes up skin, hair and nails. Cutaneous SCC is an invasive disease, referring to cancer cells that have grown beyond the epidermis. SCC can sometimes metastasise and may prove fatal.  Intraepidermal carcinoma (cutaneous SCC in situ) and mucosal SCC are considered elsewhere. Who gets cutaneous squamous cell carcinoma? Risk factors for cutaneous SCC include:  Age and sex: SCCs are particularly prevalent in elderly males. However, they also affect females and younger adults. Previous SCC or another form of skin cancer (basal cell carcinoma, melanoma) are a strong predictor for further skin cancers. Actinic keratoses   Outdoor occupation or recreation   Smoking   Fair skin, blue eyes and blond or red hair   Previous cutaneous injury, thermal burn, disease (eg cutaneous lupus, epidermolysis bullosa, leg ulcer)   Inherited syndromes: SCC is a particular problem for families with xeroderma pigmentosum and albinism   Other risk factors include ionising radiation, exposure to arsenic, and immune suppression due to disease (eg chronic lymphocytic leukaemia) or medicines. Organ transplant recipients have a massively increased risk of developing SCC. What causes cutaneous squamous cell carcinoma? More than 90% of cases of SCC are associated with numerous DNA mutations in multiple somatic genes. Mutations in the p53 tumour suppressor gene are caused by exposure to ultraviolet radiation (UV), especially UVB (known as signature 7).  Other signature mutations relate to cigarette smoking, ageing and immune suppression (eg, to drugs such as azathioprine). Mutations in signalling pathways affect the epidermal growth factor receptor, ANTHONY, Fyn, and [id number] signalling. Beta-genus human papillomaviruses (wart virus) are thought to play a role in SCC arising in immune-suppressed populations. ?-HPV and HPV subtypes 5, 8, 17, 20, 24, and 38 have also been associated with an increased risk of cutaneous SCC in immunocompetent individuals. What are the clinical features of cutaneous squamous cell carcinoma? Cutaneous SCCs present as enlarging scaly or crusted lumps. They usually arise within pre-existing actinic keratosis or intraepidermal carcinoma. They grow over weeks to months   They may ulcerate   They are often tender or painful   Located on sun-exposed sites, particularly the face, lips, ears, hands, forearms and lower legs   Size varies from a few millimetres to several centimetres in diameter. Types of cutaneous squamous cell carcinoma  Distinct clinical types of invasive cutaneous SCC include:  Cutaneous horn -- the horn is due to excessive production of keratin   Keratoacanthoma (KA) -- a rapidly growing keratinising nodule that may resolve without treatment   Carcinoma cuniculatum (‘verrucous carcinoma’), a slow-growing, warty tumour on the sole of the foot. Multiple eruptive SCC/KA-like lesions arising in syndromes, such as multiple self-healing squamous epitheliomas of Newsome-Smith and Grzybowski syndrome  The pathologist may classify a tumour as well differentiated, moderately well differentiated, poorly differentiated or anaplastic cutaneous SCC. There are other variants. Classification of squamous cell carcinoma by risk  Cutaneous SCC is classified as low-risk or high-risk, depending on the chance of tumour recurrence and metastasis.  Characteristics of high-risk SCC include:  High-risk cutaneous squamous cell carcinoma has the following characteristics:  Diameter greater than or equal to carcinoma? Cutaneous SCC is nearly always treated surgically. Most cases are excised with a 3-10 mm margin of normal tissue around a visible tumour. A flap or skin graft may be needed to repair the defect. Other methods of removal include:  Shave, curettage, and electrocautery for low-risk tumours on trunk and limbs   Aggressive cryotherapy for very small, thin, low-risk tumours   Mohs micrographic surgery for large facial lesions with indistinct margins or recurrent tumours   Radiotherapy for an inoperable tumour, patients unsuitable for surgery, or as adjuvant    What is the treatment for advanced or metastatic squamous cell carcinoma? Locally advanced primary, recurrent or metastatic SCC requires multidisciplinary consultation. Often a combination of treatments is used. Surgery   Radiotherapy   Cemiplimab   Experimental targeted therapy using epidermal growth factor receptor inhibitors    How can cutaneous squamous cell carcinoma be prevented? There is a great deal of evidence to show that very careful sun protection at any time of life reduces the number of SCCs. This is particularly important in ageing, sun-damaged, fair skin; in patients that are immune suppressed; and in those who already have actinic keratoses or previous SCC. Stay indoors or under the shade in the middle of the day   Wear covering clothing   Apply high protection factor SPF50+ broad-spectrum sunscreens generously to exposed skin if outdoors   Avoid indoor tanning (sun beds, solaria)    Oral nicotinamide (vitamin B3) in a dose of 500 mg twice daily may reduce the number and severity of SCCs in people at high risk. Patients with multiple squamous cell carcinomas may be prescribed an oral retinoid (acitretin or isotretinoin). These reduce the number of tumours but have some nuisance side effects. What is the outlook for cutaneous squamous cell carcinoma? Most SCCs are cured by treatment.  A cure is most likely if treatment is undertaken when the lesion is small. The risk of recurrence or disease-associated death is greater for tumours that are > 20 mm in diameter and/or > 2 mm in thickness at the time of surgical excision. About 50% of people at high risk of SCC develop a second one within 5 years of the first. They are also at increased risk of other skin cancers, especially melanoma. Regular self-skin examinations and long-term annual skin checks by an experienced health professional are recommended.

## 2024-04-20 ENCOUNTER — APPOINTMENT (EMERGENCY)
Dept: RADIOLOGY | Facility: HOSPITAL | Age: 44
End: 2024-04-20
Payer: COMMERCIAL

## 2024-04-20 ENCOUNTER — HOSPITAL ENCOUNTER (EMERGENCY)
Facility: HOSPITAL | Age: 44
Discharge: HOME | End: 2024-04-20
Attending: EMERGENCY MEDICINE
Payer: COMMERCIAL

## 2024-04-20 ENCOUNTER — OFFICE VISIT (OUTPATIENT)
Age: 44
End: 2024-04-20
Payer: COMMERCIAL

## 2024-04-20 VITALS
RESPIRATION RATE: 18 BRPM | SYSTOLIC BLOOD PRESSURE: 135 MMHG | OXYGEN SATURATION: 100 % | BODY MASS INDEX: 24.48 KG/M2 | HEART RATE: 76 BPM | DIASTOLIC BLOOD PRESSURE: 77 MMHG | WEIGHT: 170.6 LBS | TEMPERATURE: 97.2 F

## 2024-04-20 VITALS
OXYGEN SATURATION: 97 % | DIASTOLIC BLOOD PRESSURE: 79 MMHG | SYSTOLIC BLOOD PRESSURE: 131 MMHG | HEART RATE: 86 BPM | RESPIRATION RATE: 18 BRPM | TEMPERATURE: 97.7 F

## 2024-04-20 DIAGNOSIS — M54.2 NECK PAIN: Primary | ICD-10-CM

## 2024-04-20 DIAGNOSIS — M54.2 NECK PAIN: ICD-10-CM

## 2024-04-20 DIAGNOSIS — R07.81 PLEURITIC CHEST PAIN: Primary | ICD-10-CM

## 2024-04-20 LAB
ANION GAP SERPL CALCULATED.3IONS-SCNC: 8 MMOL/L (ref 4–13)
BASOPHILS # BLD AUTO: 0.04 THOUSANDS/ÂΜL (ref 0–0.1)
BASOPHILS NFR BLD AUTO: 1 % (ref 0–1)
BUN SERPL-MCNC: 15 MG/DL (ref 5–25)
CALCIUM SERPL-MCNC: 9.6 MG/DL (ref 8.4–10.2)
CARDIAC TROPONIN I PNL SERPL HS: <2 NG/L
CHLORIDE SERPL-SCNC: 102 MMOL/L (ref 96–108)
CO2 SERPL-SCNC: 32 MMOL/L (ref 21–32)
CREAT SERPL-MCNC: 0.96 MG/DL (ref 0.6–1.3)
D DIMER PPP FEU-MCNC: <0.27 UG/ML FEU
EOSINOPHIL # BLD AUTO: 0.1 THOUSAND/ÂΜL (ref 0–0.61)
EOSINOPHIL NFR BLD AUTO: 2 % (ref 0–6)
ERYTHROCYTE [DISTWIDTH] IN BLOOD BY AUTOMATED COUNT: 12 % (ref 11.6–15.1)
GFR SERPL CREATININE-BSD FRML MDRD: 97 ML/MIN/1.73SQ M
GLUCOSE SERPL-MCNC: 125 MG/DL (ref 65–140)
HCT VFR BLD AUTO: 48 % (ref 36.5–49.3)
HGB BLD-MCNC: 16 G/DL (ref 12–17)
IMM GRANULOCYTES # BLD AUTO: 0.01 THOUSAND/UL (ref 0–0.2)
IMM GRANULOCYTES NFR BLD AUTO: 0 % (ref 0–2)
LYMPHOCYTES # BLD AUTO: 1.72 THOUSANDS/ÂΜL (ref 0.6–4.47)
LYMPHOCYTES NFR BLD AUTO: 33 % (ref 14–44)
MCH RBC QN AUTO: 29.9 PG (ref 26.8–34.3)
MCHC RBC AUTO-ENTMCNC: 33.3 G/DL (ref 31.4–37.4)
MCV RBC AUTO: 90 FL (ref 82–98)
MONOCYTES # BLD AUTO: 0.4 THOUSAND/ÂΜL (ref 0.17–1.22)
MONOCYTES NFR BLD AUTO: 8 % (ref 4–12)
NEUTROPHILS # BLD AUTO: 2.96 THOUSANDS/ÂΜL (ref 1.85–7.62)
NEUTS SEG NFR BLD AUTO: 56 % (ref 43–75)
NRBC BLD AUTO-RTO: 0 /100 WBCS
PLATELET # BLD AUTO: 293 THOUSANDS/UL (ref 149–390)
PMV BLD AUTO: 9.6 FL (ref 8.9–12.7)
POTASSIUM SERPL-SCNC: 4.5 MMOL/L (ref 3.5–5.3)
RBC # BLD AUTO: 5.36 MILLION/UL (ref 3.88–5.62)
S PYO AG THROAT QL: NEGATIVE
SODIUM SERPL-SCNC: 142 MMOL/L (ref 135–147)
TSH SERPL DL<=0.05 MIU/L-ACNC: 1.13 UIU/ML (ref 0.45–4.5)
WBC # BLD AUTO: 5.23 THOUSAND/UL (ref 4.31–10.16)

## 2024-04-20 PROCEDURE — 93005 ELECTROCARDIOGRAM TRACING: CPT

## 2024-04-20 PROCEDURE — 99285 EMERGENCY DEPT VISIT HI MDM: CPT | Performed by: EMERGENCY MEDICINE

## 2024-04-20 PROCEDURE — 85379 FIBRIN DEGRADATION QUANT: CPT | Performed by: EMERGENCY MEDICINE

## 2024-04-20 PROCEDURE — 84443 ASSAY THYROID STIM HORMONE: CPT | Performed by: EMERGENCY MEDICINE

## 2024-04-20 PROCEDURE — 85025 COMPLETE CBC W/AUTO DIFF WBC: CPT | Performed by: EMERGENCY MEDICINE

## 2024-04-20 PROCEDURE — 71046 X-RAY EXAM CHEST 2 VIEWS: CPT

## 2024-04-20 PROCEDURE — 99285 EMERGENCY DEPT VISIT HI MDM: CPT

## 2024-04-20 PROCEDURE — 84484 ASSAY OF TROPONIN QUANT: CPT | Performed by: EMERGENCY MEDICINE

## 2024-04-20 PROCEDURE — 36415 COLL VENOUS BLD VENIPUNCTURE: CPT | Performed by: EMERGENCY MEDICINE

## 2024-04-20 PROCEDURE — 96374 THER/PROPH/DIAG INJ IV PUSH: CPT

## 2024-04-20 PROCEDURE — 99214 OFFICE O/P EST MOD 30 MIN: CPT | Performed by: PHYSICIAN ASSISTANT

## 2024-04-20 PROCEDURE — 80048 BASIC METABOLIC PNL TOTAL CA: CPT | Performed by: EMERGENCY MEDICINE

## 2024-04-20 PROCEDURE — 87880 STREP A ASSAY W/OPTIC: CPT | Performed by: PHYSICIAN ASSISTANT

## 2024-04-20 RX ORDER — KETOROLAC TROMETHAMINE 30 MG/ML
15 INJECTION, SOLUTION INTRAMUSCULAR; INTRAVENOUS ONCE
Status: COMPLETED | OUTPATIENT
Start: 2024-04-20 | End: 2024-04-20

## 2024-04-20 RX ORDER — HYDROCODONE BITARTRATE AND ACETAMINOPHEN 5; 325 MG/1; MG/1
1 TABLET ORAL ONCE
Status: COMPLETED | OUTPATIENT
Start: 2024-04-20 | End: 2024-04-20

## 2024-04-20 RX ADMIN — KETOROLAC TROMETHAMINE 15 MG: 30 INJECTION, SOLUTION INTRAMUSCULAR; INTRAVENOUS at 12:24

## 2024-04-20 RX ADMIN — HYDROCODONE BITARTRATE AND ACETAMINOPHEN 1 TABLET: 5; 325 TABLET ORAL at 12:24

## 2024-04-20 NOTE — PROGRESS NOTES
[facility]        NAME: Giovanni Lewis is a 43 y.o. male  : 1980    MRN: [id number]  DATE: 2024  TIME: 11:21 AM    Assessment and Plan   Pleuritic chest pain [R07.81]  1. Pleuritic chest pain  Transfer to other facility      2. Neck pain  POCT rapid strepA    Transfer to other facility          Patient has pleuritic chest pain no tenderness over the chest wall on palpation he also has right-sided neck pain that is not directly over any of the anterior neck musculature specifically however is worse with leaning forward and flexing his neck.  This might be all related to esophagitis, he does not have ST segment elevations of greater than 2 mm in the anterior precordial leads but he does have slight J-point elevations in these segments.  Overall based on his age history presentation do not suspect acute coronary syndrome but cannot rule out source of his pleuritic chest pain baseline EKG in our available testing therefore recommended he proceed to the emergency department which he will go by private vehicle.    Patient Instructions   There are no Patient Instructions on file for this visit.    Follow up with PCP in 3-5 days.  Proceed to  ER if symptoms worsen.    If tests are performed, our office will contact you with results only if   changes need to made to the care plan discussed with you at the visit.   You [facility] full [facility].     Chief Complaint     Chief Complaint   Patient presents with   • Sore Throat     Symptoms started 3 days ago.C/o heartburn.          History of Present Illness       HPI  Patient presents to clinic today complaining of neck pain and chest pain.  He reports pain is fairly constant is worse when he is lying on his right side denies any worsening pain with activities that would be consistent with any anginal chest pain.  He does have grandfather  of heart attack at 55.  Does have history of high cholesterol.  He denies any fever.   Denies any sore throat with swallowing or redness in his throat.  He feels the pain is deeper in his neck and does radiate down to his chest.  Denies any injury or trauma.  He was having some heartburn Wednesday    Review of Systems   Review of Systems  All other related systems reviewed and are negative except as noted in HPI    Current Medications       Current Outpatient Medications:   •  doxycycline monohydrate (MONODOX) 50 mg capsule, Take 1 capsule (50 mg total) by mouth in the morning, Disp: 90 capsule, Rfl: 3  •  tadalafil (CIALIS) 20 MG tablet, Take by mouth, Disp: , Rfl:   •  ketoconazole (NIZORAL) 2 % shampoo, Apply 1 Application topically 2 (two) times a week To scalp (Patient not taking: Reported on 4/20/2024), Disp: 120 mL, Rfl: 3    Current Allergies     Allergies as of 04/20/2024 - Reviewed 04/20/2024   Allergen Reaction Noted   • Pollen extract Cough 01/21/2022            The following portions of the patient's history were reviewed and updated as appropriate: allergies, current medications, past family history, past medical history, past social history, past surgical history and problem list.     Past Medical History:   Diagnosis Date   • Microhematuria        Past Surgical History:   Procedure Laterality Date   • IN RPR 1ST INGUN HRNA AGE 6 YRS/> REDUCIBLE Left 8/7/2019    Procedure: REPAIR HERNIA INGUINAL;  Surgeon: Sridevi Boykin MD;  Location: [facility];  Service: General       Family History   Problem Relation Age of Onset   • Diabetes Mother    • No Known Problems Father          Medications have been verified.        Objective   /77   Pulse 76   Temp (!) 97.2 °F (36.2 °C)   Resp 18   Wt 77.4 kg (170 lb 9.6 oz)   SpO2 100%   BMI 24.48 kg/m²   No LMP for male patient.       Physical Exam     Physical Exam  Constitutional:       General: He is not in acute distress.     Appearance: He is well-developed.   HENT:      Head: Normocephalic and atraumatic.   Eyes:      General: No  "scleral icterus.     Conjunctiva/sclera: Conjunctivae normal.   Neck:      Thyroid: No thyromegaly.      Comments: Slight tenderness palpation at the mid point of the anterior SCM no tenderness over other regions of the neck of the infrahyoid muscles there is no palpable subcutaneous mass or no visible lesions  Cardiovascular:      Rate and Rhythm: Normal rate and regular rhythm.      Comments: There is no tenderness palpation over the chest wall or costochondral cartilages  Pulmonary:      Effort: Pulmonary effort is normal. No respiratory distress.      Breath sounds: No stridor.   Musculoskeletal:      Cervical back: Normal range of motion and neck supple.   Lymphadenopathy:      Cervical: No cervical adenopathy.   Skin:     General: Skin is warm and dry.   Neurological:      Mental Status: He is alert and oriented to person, place, and time.   Psychiatric:         Behavior: Behavior normal.         Ortho Exam    Twelve-lead EKG demonstrates sinus arrhythmia, in leads III and 4 there are J-point elevations of less than 2 mm no reciprocal changes    Procedures  No Procedures performed today        Note: Portions of this record may have been created with voice recognition software. Occasional wrong word or \"sound a like\" substitutions may have occurred due to the inherent limitations of voice recognition software. Please read the chart carefully and recognize, using context, where substitutions have occurred.*      "

## 2024-04-20 NOTE — DISCHARGE INSTRUCTIONS
A  personal message from Dr. Brett Gunn,  Thank you so much for allowing me to care for you today.    I pride myself in the care and attention I give all my patients.  I hope you were a witness to this tonight.   If for any reason your condition does not improve or worsens, or you have a question that was not answered during your visit you can feel free to text me on my personal phone #  # [phone].   I will answer to your message and continue your care past your emergency room visit.     Please understand that although you are being discharged because your condition has been deemed stable and able to be managed on an outpatient setting. However your condition may worsen as part of the natural progression of the illness/condition, if this occurs please come back to the emergency department for a repeat evaluation.

## 2024-04-21 LAB
ATRIAL RATE: 79 BPM
P AXIS: 55 DEGREES
PR INTERVAL: 124 MS
QRS AXIS: 79 DEGREES
QRSD INTERVAL: 84 MS
QT INTERVAL: 356 MS
QTC INTERVAL: 408 MS
T WAVE AXIS: 53 DEGREES
VENTRICULAR RATE: 79 BPM

## 2024-04-21 PROCEDURE — 93010 ELECTROCARDIOGRAM REPORT: CPT | Performed by: INTERNAL MEDICINE

## 2024-04-21 NOTE — ED PROVIDER NOTES
History  Chief Complaint   Patient presents with    Chest Pain     Pt c/o pain in upper chest and throat pain since Wednesday, pt denies any injury      Giovanni Lewis is a 43 y.o.  year old male  Past Medical History:  No date: Microhematuria  Social History    Tobacco Use      Smoking status: Former        Packs/day: 0.00        Years: 1 pack/day for 10.0 years (10.0 ttl pk-yrs)        Types: Cigarettes        Start date:         Quit date:         Years since quittin.3      Smokeless tobacco: Never    Vaping Use      Vaping status: Never Used    Alcohol use: Yes      Alcohol/week: 2.0 standard drinks of alcohol      Types: 2 Shots of liquor per week      Comment: about 4 days a week     Drug use: No    Patient presents with:  Chest Pain: Pt c/o pain in upper chest and throat pain since Wednesday, pt denies any injury   This is a 43-year-old gentleman who was referred to emergency department by urgent care per primary care provider secondary to pleuritic chest pain and neck pain.  Patient had a questionable abnormal EKG.  The pain has been going on for 4 days.  It is mostly in his neck area but it seemed to start in his chest before radiated up.  He does not to be pleuritic and more in the right side of his neck area.  There is no swelling no masses no injuries or trauma.    History obtained directly from the PATIENT              History provided by:  Patient   used: No    Chest Pain  Associated symptoms: no abdominal pain, no back pain, no cough, no fever, no palpitations, no shortness of breath and not vomiting        Prior to Admission Medications   Prescriptions Last Dose Informant Patient Reported? Taking?   doxycycline monohydrate (MONODOX) 50 mg capsule   No No   Sig: Take 1 capsule (50 mg total) by mouth in the morning   ketoconazole (NIZORAL) 2 % shampoo   No No   Sig: Apply 1 Application topically 2 (two) times a week To scalp   Patient not taking: Reported on 2024    tadalafil (CIALIS) 20 MG tablet  Self Yes No   Sig: Take by mouth      Facility-Administered Medications: None       Past Medical History:   Diagnosis Date    Microhematuria        Past Surgical History:   Procedure Laterality Date    RI RPR 1ST INGUN HRNA AGE 6 YRS/> REDUCIBLE Left 2019    Procedure: REPAIR HERNIA INGUINAL;  Surgeon: Sridevi Boykin MD;  Location: [facility];  Service: General       Family History   Problem Relation Age of Onset    Diabetes Mother     No Known Problems Father      I have reviewed and agree with the history as documented.    E-Cigarette/Vaping    E-Cigarette Use Never User      E-Cigarette/Vaping Substances    Nicotine No     THC No     CBD No     Flavoring No     Other No     Unknown No      Social History     Tobacco Use    Smoking status: Former     Current packs/day: 0.00     Average packs/day: 1 pack/day for 10.0 years (10.0 ttl pk-yrs)     Types: Cigarettes     Start date:      Quit date:      Years since quittin.3    Smokeless tobacco: Never   Vaping Use    Vaping status: Never Used   Substance Use Topics    Alcohol use: Yes     Alcohol/week: 2.0 standard drinks of alcohol     Types: 2 Shots of liquor per week     Comment: about 4 days a week     Drug use: No       Review of Systems   Constitutional:  Negative for chills and fever.   HENT:  Negative for ear pain and sore throat.    Eyes:  Negative for pain and visual disturbance.   Respiratory:  Negative for cough and shortness of breath.    Cardiovascular:  Positive for chest pain. Negative for palpitations.   Gastrointestinal:  Negative for abdominal pain and vomiting.   Genitourinary:  Negative for dysuria and hematuria.   Musculoskeletal:  Negative for arthralgias and back pain.   Skin:  Negative for color change and rash.   Neurological:  Negative for seizures and syncope.   All other systems reviewed and are negative.      Physical Exam  Physical Exam  Vitals and nursing note reviewed.    Constitutional:       General: He is not in acute distress.     Appearance: He is well-developed and normal weight.   HENT:      Head: Normocephalic and atraumatic.   Eyes:      Extraocular Movements: Extraocular movements intact.      Conjunctiva/sclera: Conjunctivae normal.      Pupils: Pupils are equal, round, and reactive to light.   Neck:      Comments: Patient has some tenderness to palpation of his right neck.  But there is no masses.  No swelling.  Thyroid seems normal in size.  Cardiovascular:      Rate and Rhythm: Normal rate and regular rhythm.      Heart sounds: No murmur heard.  Pulmonary:      Effort: Pulmonary effort is normal. No respiratory distress.      Breath sounds: Normal breath sounds.   Abdominal:      Palpations: Abdomen is soft.      Tenderness: There is no abdominal tenderness.   Musculoskeletal:         General: No swelling.      Cervical back: Neck supple.      Right lower leg: No tenderness. No edema.      Left lower leg: No tenderness. No edema.   Skin:     General: Skin is warm and dry.      Capillary Refill: Capillary refill takes less than 2 seconds.   Neurological:      General: No focal deficit present.      Mental Status: He is alert and oriented to person, place, and time.   Psychiatric:         Mood and Affect: Mood normal.         Vital Signs  ED Triage Vitals   Temperature Pulse Respirations Blood Pressure SpO2   04/20/24 1141 04/20/24 1141 04/20/24 1141 04/20/24 1141 04/20/24 1141   97.7 °F (36.5 °C) 78 18 136/85 98 %      Temp Source Heart Rate Source Patient Position - Orthostatic VS BP Location FiO2 (%)   04/20/24 1141 04/20/24 1141 04/20/24 1141 04/20/24 1141 --   Temporal Monitor Sitting Left arm       Pain Score       04/20/24 1224       5           Vitals:    04/20/24 1141 04/20/24 1329   BP: 136/85 131/79   Pulse: 78 86   Patient Position - Orthostatic VS: Sitting          Visual Acuity      ED Medications  Medications   ketorolac (TORADOL) injection 15 mg (15 mg  Intravenous Given 4/20/24 1224)   HYDROcodone-acetaminophen (NORCO) 5-325 mg per tablet 1 tablet (1 tablet Oral Given 4/20/24 1224)       Diagnostic Studies  Results Reviewed       Procedure Component Value Units Date/Time    TSH [[id number]]  (Normal) Collected: 04/20/24 1158    Lab Status: Final result Specimen: Blood from Arm, Left Updated: 04/20/24 1426     TSH 3RD GENERATON 1.131 uIU/mL     D-Dimer [[id number]]  (Normal) Collected: 04/20/24 1158    Lab Status: Final result Specimen: Blood from Arm, Left Updated: 04/20/24 1306     D-Dimer, Quant <0.27 ug/ml FEU     HS Troponin 0hr (reflex protocol) [[id number]]  (Normal) Collected: 04/20/24 1158    Lab Status: Final result Specimen: Blood from Arm, Left Updated: 04/20/24 1235     hs TnI 0hr <2 ng/L     Basic metabolic panel [[id number]] Collected: 04/20/24 1158    Lab Status: Final result Specimen: Blood from Arm, Left Updated: 04/20/24 1230     Sodium 142 mmol/L      Potassium 4.5 mmol/L      Chloride 102 mmol/L      CO2 32 mmol/L      ANION GAP 8 mmol/L      BUN 15 mg/dL      Creatinine 0.96 mg/dL      Glucose 125 mg/dL      Calcium 9.6 mg/dL      eGFR 97 ml/min/1.73sq m     Narrative:      National Kidney Disease Foundation guidelines for Chronic Kidney Disease (CKD):     Stage 1 with normal or high GFR (GFR > 90 mL/min/1.73 square meters)    Stage 2 Mild CKD (GFR = 60-89 mL/min/1.73 square meters)    Stage 3A Moderate CKD (GFR = 45-59 mL/min/1.73 square meters)    Stage 3B Moderate CKD (GFR = 30-44 mL/min/1.73 square meters)    Stage 4 Severe CKD (GFR = 15-29 mL/min/1.73 square meters)    Stage 5 End Stage CKD (GFR <15 mL/min/1.73 square meters)  Note: GFR calculation is accurate only with a steady state creatinine    CBC and differential [[id number]] Collected: 04/20/24 1158    Lab Status: Final result Specimen: Blood from Arm, Left Updated: 04/20/24 1210     WBC 5.23 Thousand/uL      RBC 5.36 Million/uL      Hemoglobin 16.0 g/dL      Hematocrit 48.0 %      MCV  90 fL      MCH 29.9 pg      MCHC 33.3 g/dL      RDW 12.0 %      MPV 9.6 fL      Platelets 293 Thousands/uL      nRBC 0 /100 WBCs      Segmented % 56 %      Immature Grans % 0 %      Lymphocytes % 33 %      Monocytes % 8 %      Eosinophils Relative 2 %      Basophils Relative 1 %      Absolute Neutrophils 2.96 Thousands/µL      Absolute Immature Grans 0.01 Thousand/uL      Absolute Lymphocytes 1.72 Thousands/µL      Absolute Monocytes 0.40 Thousand/µL      Eosinophils Absolute 0.10 Thousand/µL      Basophils Absolute 0.04 Thousands/µL                    XR chest 2 views   ED Interpretation by Brett Gunn MD (04/20 1222)   Radiology studies have been independently visualized by me.  Preliminary interpretation: no ptx, no pleural effusion, normal heart size, no infiltrate or suspicious masses  All radiology studies will be officially read by the radiologist and any discrepancies flagged and follow through the discrepancy management process to make the patient aware of significant results.          Final Result by Johnny Maher MD (04/20 4940)      No acute cardiopulmonary disease.            Workstation performed: [id number]                    Procedures  Procedures         ED Course  ED Course as of 04/21/24 1320   Sat Apr 20, 2024   1142 EKG shows normal sinus rhythm rate of 79 bpm normal axis.  No ST elevations or depressions.  No signs of ischemia.   1213 WBC: 5.23   1213 Hemoglobin: 16.0   1213 Hematocrit: 48.0   1316 D-Dimer, Quant: <0.27   1317 hs TnI 0hr: <2   Sun Apr 21, 2024   1319 TSH 3RD GENERATON: 1.131             HEART Risk Score      Flowsheet Row Most Recent Value   Heart Score Risk Calculator    History 0 Filed at: 04/21/2024 1318   ECG 0 Filed at: 04/21/2024 1318   Age 0 Filed at: 04/21/2024 1318   Risk Factors 1 Filed at: 04/21/2024 1318   Troponin 0 Filed at: 04/21/2024 1318   HEART Score 1 Filed at: 04/21/2024 1318                          SBIRT 20yo+      Flowsheet Row Most Recent Value    Initial Alcohol Screen: US AUDIT-C     1. How often do you have a drink containing alcohol? 0 Filed at: 04/20/2024 1139   2. How many drinks containing alcohol do you have on a typical day you are drinking?  0 Filed at: 04/20/2024 1139   3a. Male UNDER 65: How often do you have five or more drinks on one occasion? 0 Filed at: 04/20/2024 1139   Audit-C Score 0 Filed at: 04/20/2024 1139   FLOR: How many times in the past year have you...    Used an illegal drug or used a prescription medication for non-medical reasons? Never Filed at: 04/20/2024 1139                      Medical Decision Making  Differential diagnosis includes but not limited to: Musculoskeletal chest pain, costochondritis, ACS, acute MI, pleurisy, pneumothorax, pleural effusion, pneumonia, pulmonary embolism, thoracic aortic dissection, mediastinitis, GERD/reflux gastritis/PUD.  Patient's co-morbidities and risk factors were considered in the work-up of this patient.      I have ordered CBC.  This was done to assess for leukocytosis versus leukopenia as possible indicators of infection viral versus bacterial.  Also included hemoglobin and hematocrit for assess for anemia.  And assess platelet numbers.  Thrombocytosis as a marker of inflammation and of course thrombocytopenia assess result of coagulation disorder or DIC.    Metabolic panel to assess for electrolyte deficiency, assess kidney function, and blood sugar levels to assess for hypoglycemia versus hyperglycemia as possible causes of the patient's symptoms.    Liver function test -to assess for liver inflammation with a be viral, obstructive, or reactive to underlying infection/shock liver    Troponin - as a marker of cardiac injury.    Dimer test -given patient's presentation as a cause of chest pain or trouble breathing, and normal level with a low risk patient would indicate absence of pulmonary embolism.      Problems Addressed:  Neck pain: acute illness or injury    Amount and/or  Complexity of Data Reviewed  Labs: ordered. Decision-making details documented in ED Course.  Radiology: ordered and independent interpretation performed.    Risk  OTC drugs.  Prescription drug management.             Disposition  Final diagnoses:   Neck pain     Time reflects when diagnosis was documented in both MDM as applicable and the Disposition within this note       Time User Action Codes Description Comment    4/20/2024  1:25 PM Brett Gunn Add [M54.2] Neck pain           ED Disposition       ED Disposition   Discharge    Condition   Stable    Date/Time   Sat Apr 20, 2024 132    Comment   Giovanni Lewis discharge to home/self care.                   Follow-up Information       Follow up With Specialties Details Why Contact Info    Edvin Paiz MD Internal Medicine In 3 days If symptoms worsen [address] [place] [address]  [phone]              Discharge Medication List as of 4/20/2024  1:27 PM        CONTINUE these medications which have NOT CHANGED    Details   doxycycline monohydrate (MONODOX) 50 mg capsule Take 1 capsule (50 mg total) by mouth in the morning, Starting Tue 8/15/2023, Until Fri 8/9/2024, Normal      ketoconazole (NIZORAL) 2 % shampoo Apply 1 Application topically 2 (two) times a week To scalp, Starting Thu 8/17/2023, Normal      tadalafil (CIALIS) 20 MG tablet Take by mouth, Historical Med                 PDMP Review       None            ED Provider  Electronically Signed by             Brett Gunn MD  04/21/24 9060

## 2024-08-16 DIAGNOSIS — L71.9 ROSACEA: ICD-10-CM

## 2024-08-16 RX ORDER — DOXYCYCLINE 50 MG/1
50 CAPSULE ORAL EVERY MORNING
Qty: 90 CAPSULE | Refills: 0 | Status: SHIPPED | OUTPATIENT
Start: 2024-08-16 | End: 2024-08-21 | Stop reason: SDUPTHER

## 2024-08-16 NOTE — TELEPHONE ENCOUNTER
Patient scheduled an appointment on 08/22 he would like to know if a courtesy refill can be send to [facility] pharmacy   [address]   [place] [address]   .

## 2024-08-21 ENCOUNTER — OFFICE VISIT (OUTPATIENT)
Age: 44
End: 2024-08-21
Payer: COMMERCIAL

## 2024-08-21 VITALS
HEART RATE: 68 BPM | DIASTOLIC BLOOD PRESSURE: 60 MMHG | OXYGEN SATURATION: 98 % | HEIGHT: 70 IN | SYSTOLIC BLOOD PRESSURE: 100 MMHG | BODY MASS INDEX: 23.62 KG/M2 | RESPIRATION RATE: 20 BRPM | TEMPERATURE: 97.5 F | WEIGHT: 165 LBS

## 2024-08-21 DIAGNOSIS — L81.4 LENTIGO: ICD-10-CM

## 2024-08-21 DIAGNOSIS — Z76.0 MEDICATION REFILL: ICD-10-CM

## 2024-08-21 DIAGNOSIS — D18.01 CHERRY ANGIOMA: ICD-10-CM

## 2024-08-21 DIAGNOSIS — D22.9 NEVUS: ICD-10-CM

## 2024-08-21 DIAGNOSIS — Z13.89 SCREENING FOR SKIN CONDITION: Primary | ICD-10-CM

## 2024-08-21 DIAGNOSIS — L82.1 SEBORRHEIC KERATOSIS: ICD-10-CM

## 2024-08-21 PROCEDURE — 99213 OFFICE O/P EST LOW 20 MIN: CPT | Performed by: NURSE PRACTITIONER

## 2024-08-21 RX ORDER — DOXYCYCLINE 50 MG/1
50 CAPSULE ORAL DAILY
Qty: 30 CAPSULE | Refills: 11 | Status: SHIPPED | OUTPATIENT
Start: 2024-08-21 | End: 2024-09-20

## 2024-08-21 RX ORDER — KETOCONAZOLE 20 MG/ML
SHAMPOO TOPICAL
Qty: 120 ML | Refills: 6 | Status: SHIPPED | OUTPATIENT
Start: 2024-08-22

## 2024-08-21 NOTE — PROGRESS NOTES
"[facility] Note     Patient Name: Giovanni Lewis  Encounter Date: 08/21/2024     Have you been cared for by a [facility] Dermatologist in the last 3 years and, if so, which description applies to you?    Yes.  I have been here within the last 3 years, and my medical history has NOT changed since that time.  I am FEMALE/of child-bearing potential.    REVIEW OF SYSTEMS:  Have you recently had or currently have any of the following? No changes in my recent health.   PAST MEDICAL HISTORY:  Have you personally ever had or currently have any of the following?  If \"YES,\" then please provide more detail. No changes in my medical history.   HISTORY OF IMMUNOSUPPRESSION: Do you have a history of any of the following:  Systemic Immunosuppression such as Diabetes, Biologic or Immunotherapy, Chemotherapy, Organ Transplantation, Bone Marrow Transplantation?  No     Answering \"YES\" requires the addition of the dotphrase \"IMMUNOSUPPRESSED\" as the first diagnosis of the patient's visit.   FAMILY HISTORY:  Any \"first degree relatives\" (parent, brother, sister, or child) with the following?    No changes in my family's known health.   PATIENT EXPERIENCE:    Do you want the Dermatologist to perform a COMPLETE skin exam today including a clinical examination under the \"bra and underwear\" areas?  NO, did not examine under the underwear   If necessary, do we have your permission to call and leave a detailed message on your Preferred Phone number that includes your specific medical information?  Yes      Allergies   Allergen Reactions    Pollen Extract Cough      Current Outpatient Medications:     doxycycline monohydrate (MONODOX) 50 mg capsule, Take 1 capsule (50 mg total) by mouth every morning, Disp: 90 capsule, Rfl: 0    ketoconazole (NIZORAL) 2 % shampoo, Apply 1 Application topically 2 (two) times a week To scalp (Patient not taking: Reported on 4/20/2024), Disp: 120 mL, Rfl: 3    tadalafil (CIALIS) 20 MG tablet, " Take by mouth, Disp: , Rfl:           Whom besides the patient is providing clinical information about today's encounter?   NO ADDITIONAL HISTORIAN (patient alone provided history)    Physical Exam and Assessment/Plan by Diagnosis:    SEBORRHEIC KERATOSES  - Relevant exam: Scattered over the trunk/extremities are waxy brown to black plaques and papules with stuck on appearance and consistent dermoscopy  - Exam and clinical history consistent with seborrheic keratoses  - Counseled that these are benign growths that do not require treatment    MELANOCYTIC NEVI  -Relevant exam: Scattered over the trunk/extremities are homogenously pigmented brown macules and papules. ELM performed and without concerning findings. No outliers unless otherwise noted in today's note  - Exam and clinical history consistent with melanocytic nevi  - Counseled to return to clinic prior to scheduled appointment should any of these lesions change or should any new lesions of concern arise  - Counseled on use of sun protection daily. Reviewed latest FDA sunscreen guidelines, including use of broad spectrum (UVA and UVB blocking) sunscreen or sun protective clothing with SPF 30-50 every 2-3 hours and reapplied after exposure to water    LENTIGINES  OTHER SKIN CHANGES DUE TO CHRONIC EXPOSURE TO NONIONIZING RADIATION  - Relevant exam: Over sun exposed areas are brown macules. ELM performed and without concerning findings.  - Exam and clinical history consistent with lentigines.  - Counseled to return to clinic prior to scheduled appointment should any of these lesions change or should any new lesions of concern arise.  - Recommended use of sunscreen as above and below.    CHERRY ANGIOMAS  - Relevant exam: Scattered over the trunk/extremities are red papules  - Exam and clinical history consistent with cherry angiomas  - Educated that these are benign      Scribe Attestation      I,:  Haroon Yu MA am acting as a scribe while in the presence  of the attending physician.:       I,:  SONNY Andrews personally performed the services described in this documentation    as scribed in my presence.:

## 2024-09-10 ENCOUNTER — OFFICE VISIT (OUTPATIENT)
Age: 44
End: 2024-09-10
Payer: COMMERCIAL

## 2024-09-10 ENCOUNTER — APPOINTMENT (OUTPATIENT)
Age: 44
End: 2024-09-10
Payer: COMMERCIAL

## 2024-09-10 VITALS
TEMPERATURE: 97.1 F | SYSTOLIC BLOOD PRESSURE: 110 MMHG | WEIGHT: 165.8 LBS | RESPIRATION RATE: 17 BRPM | OXYGEN SATURATION: 98 % | HEIGHT: 70 IN | HEART RATE: 76 BPM | DIASTOLIC BLOOD PRESSURE: 64 MMHG | BODY MASS INDEX: 23.74 KG/M2

## 2024-09-10 DIAGNOSIS — Z00.00 ANNUAL PHYSICAL EXAM: Primary | ICD-10-CM

## 2024-09-10 DIAGNOSIS — Z13.228 SCREENING FOR METABOLIC DISORDER: ICD-10-CM

## 2024-09-10 DIAGNOSIS — E78.2 MIXED HYPERLIPIDEMIA: ICD-10-CM

## 2024-09-10 PROBLEM — L71.9 ROSACEA: Status: ACTIVE | Noted: 2024-09-10

## 2024-09-10 LAB
ALBUMIN SERPL BCG-MCNC: 4.8 G/DL (ref 3.5–5)
ALP SERPL-CCNC: 48 U/L (ref 34–104)
ALT SERPL W P-5'-P-CCNC: 19 U/L (ref 7–52)
ANION GAP SERPL CALCULATED.3IONS-SCNC: 8 MMOL/L (ref 4–13)
AST SERPL W P-5'-P-CCNC: 18 U/L (ref 13–39)
BASOPHILS # BLD AUTO: 0.04 THOUSANDS/ÂΜL (ref 0–0.1)
BASOPHILS NFR BLD AUTO: 1 % (ref 0–1)
BILIRUB SERPL-MCNC: 1.18 MG/DL (ref 0.2–1)
BUN SERPL-MCNC: 12 MG/DL (ref 5–25)
CALCIUM SERPL-MCNC: 9.9 MG/DL (ref 8.4–10.2)
CHLORIDE SERPL-SCNC: 100 MMOL/L (ref 96–108)
CHOLEST SERPL-MCNC: 206 MG/DL
CO2 SERPL-SCNC: 31 MMOL/L (ref 21–32)
CREAT SERPL-MCNC: 0.89 MG/DL (ref 0.6–1.3)
EOSINOPHIL # BLD AUTO: 0.14 THOUSAND/ÂΜL (ref 0–0.61)
EOSINOPHIL NFR BLD AUTO: 3 % (ref 0–6)
ERYTHROCYTE [DISTWIDTH] IN BLOOD BY AUTOMATED COUNT: 12 % (ref 11.6–15.1)
GFR SERPL CREATININE-BSD FRML MDRD: 104 ML/MIN/1.73SQ M
GLUCOSE P FAST SERPL-MCNC: 96 MG/DL (ref 65–99)
HCT VFR BLD AUTO: 48.1 % (ref 36.5–49.3)
HDLC SERPL-MCNC: 59 MG/DL
HGB BLD-MCNC: 15.9 G/DL (ref 12–17)
IMM GRANULOCYTES # BLD AUTO: 0.01 THOUSAND/UL (ref 0–0.2)
IMM GRANULOCYTES NFR BLD AUTO: 0 % (ref 0–2)
LDLC SERPL CALC-MCNC: 124 MG/DL (ref 0–100)
LYMPHOCYTES # BLD AUTO: 1.53 THOUSANDS/ÂΜL (ref 0.6–4.47)
LYMPHOCYTES NFR BLD AUTO: 32 % (ref 14–44)
MCH RBC QN AUTO: 29.7 PG (ref 26.8–34.3)
MCHC RBC AUTO-ENTMCNC: 33.1 G/DL (ref 31.4–37.4)
MCV RBC AUTO: 90 FL (ref 82–98)
MONOCYTES # BLD AUTO: 0.42 THOUSAND/ÂΜL (ref 0.17–1.22)
MONOCYTES NFR BLD AUTO: 9 % (ref 4–12)
NEUTROPHILS # BLD AUTO: 2.62 THOUSANDS/ÂΜL (ref 1.85–7.62)
NEUTS SEG NFR BLD AUTO: 55 % (ref 43–75)
NONHDLC SERPL-MCNC: 147 MG/DL
NRBC BLD AUTO-RTO: 0 /100 WBCS
PLATELET # BLD AUTO: 298 THOUSANDS/UL (ref 149–390)
PMV BLD AUTO: 10.1 FL (ref 8.9–12.7)
POTASSIUM SERPL-SCNC: 4.9 MMOL/L (ref 3.5–5.3)
PROT SERPL-MCNC: 7.4 G/DL (ref 6.4–8.4)
RBC # BLD AUTO: 5.36 MILLION/UL (ref 3.88–5.62)
SODIUM SERPL-SCNC: 139 MMOL/L (ref 135–147)
TRIGL SERPL-MCNC: 113 MG/DL
WBC # BLD AUTO: 4.76 THOUSAND/UL (ref 4.31–10.16)

## 2024-09-10 PROCEDURE — 80053 COMPREHEN METABOLIC PANEL: CPT

## 2024-09-10 PROCEDURE — 99396 PREV VISIT EST AGE 40-64: CPT | Performed by: STUDENT IN AN ORGANIZED HEALTH CARE EDUCATION/TRAINING PROGRAM

## 2024-09-10 PROCEDURE — 80061 LIPID PANEL: CPT

## 2024-09-10 PROCEDURE — 83036 HEMOGLOBIN GLYCOSYLATED A1C: CPT

## 2024-09-10 PROCEDURE — 85025 COMPLETE CBC W/AUTO DIFF WBC: CPT

## 2024-09-10 PROCEDURE — 36415 COLL VENOUS BLD VENIPUNCTURE: CPT

## 2024-09-10 NOTE — PROGRESS NOTES
Adult Annual Physical  Name: Giovanni Lewis      : 1980      MRN: [id number]  Encounter Provider: Cesar Jean MD  Encounter Date: 9/10/2024   Encounter department: [facility] [place]    Assessment & Plan   1. Annual physical exam  Immunizations: UTD; recommended influenza and COVID vaccines  Labs: CBC, CMP, A1c, lipids  Screening: Not yet due for cancer screening; begin CRC screening at 46     2. Mixed hyperlipidemia  Last lipid panel in  with  and . Patient actively cutting down on cholesterol intake for the past two years.  -     Lipid panel; Future    3. Screening for metabolic disorder  -     Hemoglobin A1C; Future  -     Comprehensive metabolic panel; Future  -     CBC and differential; Future    Immunizations and preventive care screenings were discussed with patient today. Appropriate education was printed on patient's after visit summary.    Discussed risks and benefits of prostate cancer screening. We discussed the controversial history of PSA screening for prostate cancer in the [place] as well as the risk of over detection and over treatment of prostate cancer by way of PSA screening.  The patient understands that PSA blood testing is an imperfect way to screen for prostate cancer and that elevated PSA levels in the blood may also be caused by infection, inflammation, prostatic trauma or manipulation, urological procedures, or by benign prostatic enlargement.    The role of the digital rectal examination in prostate cancer screening was also discussed and I discussed with him that there is large interobserver variability in the findings of digital rectal examination.    Counseling:  Alcohol/drug use: discussed moderation in alcohol intake, the recommendations for healthy alcohol use, and avoidance of illicit drug use.  Dental Health: discussed importance of regular tooth brushing, flossing, and dental visits.  Injury prevention: discussed safety/seat  belts, safety helmets, smoke detectors, carbon dioxide detectors, and smoking near bedding or upholstery.  Sexual health: discussed sexually transmitted diseases, partner selection, use of condoms, avoidance of unintended pregnancy, and contraceptive alternatives.  Exercise: the importance of regular exercise/physical activity was discussed. Recommend exercise 3-5 times per week for at least 30 minutes.       Depression Screening and Follow-up Plan: Patient was screened for depression during today's encounter. They screened negative with a PHQ-2 score of 0.        History of Present Illness     Adult Annual Physical:  Patient presents for annual physical. Nazario Laguna is a 45 yo M with [facility] of ros[facility] here for annual physical. He has remote history of smoking for 20 years, quit 16 years ago. Drinking 1-2 servings of whiskey per night. 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 Over last two years has been actively working on his cholesterol intake through diet..     Diet and Physical Activity:  - Diet/Nutrition: well balanced diet.  - Exercise: 1-2 times a week on average, moderate cardiovascular exercise and strength training exercises.    Depression Screening:  - PHQ-2 Score: 0    General Health:  - Sleep: sleeps well. 6-7 hours nightly  - Hearing: normal hearing bilateral ears.  - Vision: vision problems and goes for regular eye exams. Fugue's dystrophy  - Dental: regular dental visits and brushes teeth once daily.     Health:  - History of STDs: no.   - Urinary symptoms: none.     Advanced Care Planning:  - Has an advanced directive?: no    - Has a durable medical POA?: no    - ACP document given to patient?: no      Review of Systems   Constitutional:  Negative for appetite change, chills and fever.   HENT:  Negative for congestion and sore throat.    Eyes:  Negative for visual disturbance.   Respiratory:  Negative for cough and shortness of breath.    Cardiovascular:  Negative for chest pain and leg  "swelling.   Gastrointestinal:  Negative for abdominal pain, constipation, diarrhea and nausea.   Genitourinary:  Negative for difficulty urinating and dysuria.   Musculoskeletal:  Negative for arthralgias and myalgias.   Skin:  Negative for rash.   Neurological:  Negative for dizziness, light-headedness and headaches.   Psychiatric/Behavioral:  Negative for confusion.      Medical History Reviewed by provider this encounter:  Meds  Problems       Past Medical History   Past Medical History:   Diagnosis Date    Microhematuria      Past Surgical History:   Procedure Laterality Date    HERNIA REPAIR  7/2019    [place] RPR 1ST INGUN HRNA AGE 6 YRS/> REDUCIBLE Left 08/07/2019    Procedure: REPAIR HERNIA INGUINAL;  Surgeon: Sridevi Boykin MD;  Location: [facility];  Service: General     Family History   Problem Relation Age of Onset    Diabetes Mother     No Known Problems Father      Current Outpatient Medications on File Prior to Visit   Medication Sig Dispense Refill    doxycycline monohydrate (MONODOX) 50 mg capsule Take 1 capsule (50 mg total) by mouth in the morning 30 capsule 11    ketoconazole (NIZORAL) 2 % shampoo Daily for 2 weeks straight and then on \"Mondays, Wednesdays and Fridays\":  Lather into scalp and skin on face, neck, chest, and back; leave on for 5 minutes and then rinse off completely. Do not start before August 22, 2024. 120 mL 6    tadalafil (CIALIS) 20 MG tablet Take by mouth       No current facility-administered medications on file prior to visit.     Allergies   Allergen Reactions    Pollen Extract Cough      Current Outpatient Medications on File Prior to Visit   Medication Sig Dispense Refill    doxycycline monohydrate (MONODOX) 50 mg capsule Take 1 capsule (50 mg total) by mouth in the morning 30 capsule 11    ketoconazole (NIZORAL) 2 % shampoo Daily for 2 weeks straight and then on \"Mondays, Wednesdays and Fridays\":  Lather into scalp and skin on face, neck, chest, and back; leave on for " "5 minutes and then rinse off completely. Do not start before 2024. 120 mL 6    tadalafil (CIALIS) 20 MG tablet Take by mouth       No current facility-administered medications on file prior to visit.      Social History     Tobacco Use    Smoking status: Former     Current packs/day: 0.00     Average packs/day: 1 pack/day for 10.0 years (10.0 ttl pk-yrs)     Types: Cigarettes     Start date:      Quit date:      Years since quittin.7    Smokeless tobacco: Never   Vaping Use    Vaping status: Never Used   Substance and Sexual Activity    Alcohol use: Yes     Alcohol/week: 7.0 standard drinks of alcohol     Types: 7 Cans of beer per week     Comment: about 4 days a week     Drug use: No    Sexual activity: Yes     Partners: Female     Birth control/protection: None     Comment: Just my spouse       Objective     /64 (BP Location: Left arm, Patient Position: Sitting, Cuff Size: Large)   Pulse 76   Temp (!) 97.1 °F (36.2 °C) (Tympanic)   Resp 17   Ht 5' 10\" (1.778 m)   Wt 75.2 kg (165 lb 12.8 oz)   SpO2 98%   BMI 23.79 kg/m²     Physical Exam  Constitutional:       General: He is not in acute distress.  HENT:      Right Ear: Tympanic membrane, ear canal and external ear normal.      Left Ear: Tympanic membrane, ear canal and external ear normal.      Nose: Nose normal.      Mouth/Throat:      Mouth: Mucous membranes are moist.      Pharynx: Oropharynx is clear.   Eyes:      Conjunctiva/sclera: Conjunctivae normal.      Pupils: Pupils are equal, round, and reactive to light.   Neck:      Thyroid: No thyroid mass or thyroid tenderness.   Cardiovascular:      Rate and Rhythm: Normal rate and regular rhythm.      Heart sounds: Normal heart sounds.   Pulmonary:      Effort: Pulmonary effort is normal.      Breath sounds: Normal breath sounds.   Abdominal:      General: There is no distension.      Tenderness: There is no abdominal tenderness.   Musculoskeletal:      Cervical back: Neck " supple.      Right lower leg: No edema.      Left lower leg: No edema.      Comments: Left 1st DIP osteoarthritic nodules   Skin:     General: Skin is warm and dry.   Neurological:      Mental Status: He is alert.      Sensory: Sensation is intact.      Motor: Motor function is intact.   Psychiatric:         Mood and Affect: Mood normal.         Speech: Speech normal.         Behavior: Behavior normal. Behavior is cooperative.

## 2024-09-10 NOTE — PATIENT INSTRUCTIONS
"Patient Education     Routine physical for adults   The Basics   Written by the doctors and editors at [facility]   What is a physical? -- A physical is a routine visit, or \"check-up,\" with your doctor. You might also hear it called a \"wellness visit\" or \"preventive visit.\"  During each visit, the doctor will:   Ask about your physical and mental health   Ask about your habits, behaviors, and lifestyle   Do an exam   Give you vaccines if needed   Talk to you about any medicines you take   Give advice about your health   Answer your questions  Getting regular check-ups is an important part of taking care of your health. It can help your doctor find and treat any problems you have. But it's also important for preventing health problems.  A routine physical is different from a \"sick visit.\" A sick visit is when you see a doctor because of a health concern or problem. Since physicals are scheduled ahead of time, you can think about what you want to ask the doctor.  How often should I get a physical? -- It depends on your age and health. In general, for people age 21 years and older:   If you are younger than 50 years, you might be able to get a physical every 3 years.   If you are 50 years or older, your doctor might recommend a physical every year.  If you have an ongoing health condition, like diabetes or high blood pressure, your doctor will probably want to see you more often.  What happens during a physical? -- In general, each visit will include:   Physical exam - The doctor or nurse will check your height, weight, heart rate, and blood pressure. They will also look at your eyes and ears. They will ask about how you are feeling and whether you have any symptoms that bother you.   Medicines - It's a good idea to bring a list of all the medicines you take to each doctor visit. Your doctor will talk to you about your medicines and answer any questions. Tell them if you are having any side effects that bother you. You " "should also tell them if you are having trouble paying for any of your medicines.   Habits and behaviors - This includes:   Your diet   Your exercise habits   Whether you smoke, drink alcohol, or use drugs   Whether you are sexually active   Whether you feel safe at home  Your doctor will talk to you about things you can do to improve your health and lower your risk of health problems. They will also offer help and support. For example, if you want to quit smoking, they can give you advice and might prescribe medicines. If you want to improve your diet or get more physical activity, they can help you with this, too.   Lab tests, if needed - The tests you get will depend on your age and situation. For example, your doctor might want to check your:   Cholesterol   Blood sugar   Iron level   Vaccines - The recommended vaccines will depend on your age, health, and what vaccines you already had. Vaccines are very important because they can prevent certain serious or deadly infections.   Discussion of screening - \"Screening\" means checking for diseases or other health problems before they cause symptoms. Your doctor can recommend screening based on your age, risk, and preferences. This might include tests to check for:   Cancer, such as breast, prostate, cervical, ovarian, colorectal, prostate, lung, or skin cancer   Sexually transmitted infections, such as chlamydia and gonorrhea   Mental health conditions like depression and anxiety  Your doctor will talk to you about the different types of screening tests. They can help you decide which screenings to have. They can also explain what the results might mean.   Answering questions - The physical is a good time to ask the doctor or nurse questions about your health. If needed, they can refer you to other doctors or specialists, too.  Adults older than 65 years often need other care, too. As you get older, your doctor will talk to you about:   How to prevent falling at " home   Hearing or vision tests   Memory testing   How to take your medicines safely   Making sure that you have the help and support you need at home  All topics are updated as new evidence becomes available and our peer review process is complete.  This topic retrieved from [employer] on: May 02, 2024.  Topic 495651 Version 1.0  Release: 32.4.3 - C32.122  © 2024 UpToDate, Inc. and/or its affiliates. All rights reserved.  Consumer Information Use and Disclaimer   Disclaimer: This generalized information is a limited summary of diagnosis, treatment, and/or medication information. It is not meant to be comprehensive and should be used as a tool to help the user understand and/or assess potential diagnostic and treatment options. It does NOT include all information about conditions, treatments, medications, side effects, or risks that may apply to a specific patient. It is not intended to be medical advice or a substitute for the medical advice, diagnosis, or treatment of a health care provider based on the health care provider's examination and assessment of a patient's specific and unique circumstances. Patients must speak with a health care provider for complete information about their health, medical questions, and treatment options, including any risks or benefits regarding use of medications. This information does not endorse any treatments or medications as safe, effective, or approved for treating a specific patient. UpToDate, Inc. and its affiliates disclaim any warranty or liability relating to this information or the use thereof.The use of this information is governed by the Terms of Use, available at https://www.woltersTravelZeekyuwer.com/en/know/clinical-effectiveness-terms. 2024© UpToDate, Inc. and its affiliates and/or licensors. All rights reserved.  Copyright   © 2024 Up[employer]Date, Inc. and/or its affiliates. All rights reserved.

## 2024-09-11 LAB
EST. AVERAGE GLUCOSE BLD GHB EST-MCNC: 111 MG/DL
HBA1C MFR BLD: 5.5 %

## 2025-08-07 ENCOUNTER — OFFICE VISIT (OUTPATIENT)
Age: 45
End: 2025-08-07
Payer: COMMERCIAL

## 2025-08-07 VITALS
OXYGEN SATURATION: 99 % | TEMPERATURE: 96.9 F | DIASTOLIC BLOOD PRESSURE: 76 MMHG | HEIGHT: 70 IN | BODY MASS INDEX: 23.91 KG/M2 | SYSTOLIC BLOOD PRESSURE: 114 MMHG | WEIGHT: 167 LBS | HEART RATE: 76 BPM | RESPIRATION RATE: 12 BRPM

## 2025-08-07 DIAGNOSIS — J03.90 TONSILLITIS: Primary | ICD-10-CM

## 2025-08-07 DIAGNOSIS — Z13.228 SCREENING FOR METABOLIC DISORDER: ICD-10-CM

## 2025-08-07 PROCEDURE — 99213 OFFICE O/P EST LOW 20 MIN: CPT | Performed by: STUDENT IN AN ORGANIZED HEALTH CARE EDUCATION/TRAINING PROGRAM

## 2025-08-07 RX ORDER — DOXYCYCLINE 50 MG/1
CAPSULE ORAL DAILY
COMMUNITY
Start: 2025-06-27

## (undated) DEVICE — ADHESIVE SKN CLSR HISTOACRYL FLEX 0.5ML LF

## (undated) DEVICE — GLOVE INDICATOR PI UNDERGLOVE SZ 7 BLUE

## (undated) DEVICE — SUT VICRYL 2-0 REEL 54 IN J286G

## (undated) DEVICE — BETHLEHEM UNIVERSAL MINOR GEN: Brand: CARDINAL HEALTH

## (undated) DEVICE — SUT VICRYL 3-0 SH 27 IN J416H

## (undated) DEVICE — CHLORAPREP HI-LITE 26ML ORANGE

## (undated) DEVICE — SUT PROLENE 2-0 SH 36 IN 8523H

## (undated) DEVICE — PLUMEPEN PRO 10FT

## (undated) DEVICE — SUT MONOCRYL PLUS 4-0 PS-2 18 IN MCP496G

## (undated) DEVICE — PENROSE DRAIN, 18 X 3 8: Brand: CARDINAL HEALTH

## (undated) DEVICE — INTENDED FOR TISSUE SEPARATION, AND OTHER PROCEDURES THAT REQUIRE A SHARP SURGICAL BLADE TO PUNCTURE OR CUT.: Brand: BARD-PARKER SAFETY BLADES SIZE 15, STERILE

## (undated) DEVICE — NEEDLE 25G X 1 1/2

## (undated) DEVICE — SCD SEQUENTIAL COMPRESSION COMFORT SLEEVE MEDIUM KNEE LENGTH: Brand: KENDALL SCD

## (undated) DEVICE — GLOVE PI ULTRA TOUCH SZ.6.5